# Patient Record
Sex: FEMALE | Race: WHITE | NOT HISPANIC OR LATINO | Employment: UNEMPLOYED | URBAN - METROPOLITAN AREA
[De-identification: names, ages, dates, MRNs, and addresses within clinical notes are randomized per-mention and may not be internally consistent; named-entity substitution may affect disease eponyms.]

---

## 2021-06-01 ENCOUNTER — RECORDS - HEALTHEAST (OUTPATIENT)
Dept: ADMINISTRATIVE | Facility: CLINIC | Age: 41
End: 2021-06-01

## 2021-06-03 ENCOUNTER — RECORDS - HEALTHEAST (OUTPATIENT)
Dept: ADMINISTRATIVE | Facility: CLINIC | Age: 41
End: 2021-06-03

## 2022-05-06 ENCOUNTER — HOSPITAL ENCOUNTER (EMERGENCY)
Facility: HOSPITAL | Age: 42
End: 2022-05-06

## 2023-11-17 ENCOUNTER — APPOINTMENT (OUTPATIENT)
Dept: CT IMAGING | Facility: CLINIC | Age: 43
End: 2023-11-17
Attending: STUDENT IN AN ORGANIZED HEALTH CARE EDUCATION/TRAINING PROGRAM
Payer: MEDICAID

## 2023-11-17 ENCOUNTER — HOSPITAL ENCOUNTER (INPATIENT)
Facility: CLINIC | Age: 43
LOS: 3 days | Discharge: HOME | End: 2023-11-20
Attending: EMERGENCY MEDICINE | Admitting: INTERNAL MEDICINE
Payer: MEDICAID

## 2023-11-17 DIAGNOSIS — N20.0 CALCULUS OF KIDNEY: ICD-10-CM

## 2023-11-17 DIAGNOSIS — Z76.89 ENCOUNTER TO ESTABLISH CARE: ICD-10-CM

## 2023-11-17 DIAGNOSIS — N20.1 LEFT URETERAL STONE: ICD-10-CM

## 2023-11-17 DIAGNOSIS — N12 PYELONEPHRITIS OF LEFT KIDNEY: Primary | ICD-10-CM

## 2023-11-17 DIAGNOSIS — R10.9 LEFT FLANK PAIN: ICD-10-CM

## 2023-11-17 DIAGNOSIS — N39.0 LOWER URINARY TRACT INFECTIOUS DISEASE: ICD-10-CM

## 2023-11-17 LAB
ALBUMIN SERPL BCG-MCNC: 3.8 G/DL (ref 3.5–5.2)
ALBUMIN UR-MCNC: 20 MG/DL
ALP SERPL-CCNC: 59 U/L (ref 40–150)
ALT SERPL W P-5'-P-CCNC: <5 U/L (ref 0–50)
ANION GAP SERPL CALCULATED.3IONS-SCNC: 9 MMOL/L (ref 7–15)
APPEARANCE UR: ABNORMAL
AST SERPL W P-5'-P-CCNC: 13 U/L (ref 0–45)
BACTERIA #/AREA URNS HPF: ABNORMAL /HPF
BASOPHILS # BLD AUTO: 0.1 10E3/UL (ref 0–0.2)
BASOPHILS NFR BLD AUTO: 0 %
BILIRUB SERPL-MCNC: 0.3 MG/DL
BILIRUB UR QL STRIP: NEGATIVE
BUN SERPL-MCNC: 15.1 MG/DL (ref 6–20)
CALCIUM SERPL-MCNC: 9.2 MG/DL (ref 8.6–10)
CHLORIDE SERPL-SCNC: 101 MMOL/L (ref 98–107)
COLOR UR AUTO: ABNORMAL
CREAT SERPL-MCNC: 0.87 MG/DL (ref 0.51–0.95)
CRP SERPL-MCNC: 78.8 MG/L
DEPRECATED HCO3 PLAS-SCNC: 26 MMOL/L (ref 22–29)
EGFRCR SERPLBLD CKD-EPI 2021: 85 ML/MIN/1.73M2
EOSINOPHIL # BLD AUTO: 0 10E3/UL (ref 0–0.7)
EOSINOPHIL NFR BLD AUTO: 0 %
ERYTHROCYTE [DISTWIDTH] IN BLOOD BY AUTOMATED COUNT: 12.2 % (ref 10–15)
GLUCOSE SERPL-MCNC: 90 MG/DL (ref 70–99)
GLUCOSE UR STRIP-MCNC: NEGATIVE MG/DL
HCG UR QL: NEGATIVE
HCT VFR BLD AUTO: 34.8 % (ref 35–47)
HGB BLD-MCNC: 11.4 G/DL (ref 11.7–15.7)
HGB UR QL STRIP: ABNORMAL
IMM GRANULOCYTES # BLD: 0.1 10E3/UL
IMM GRANULOCYTES NFR BLD: 1 %
KETONES UR STRIP-MCNC: NEGATIVE MG/DL
LACTATE SERPL-SCNC: 0.5 MMOL/L (ref 0.7–2)
LEUKOCYTE ESTERASE UR QL STRIP: ABNORMAL
LYMPHOCYTES # BLD AUTO: 0.6 10E3/UL (ref 0.8–5.3)
LYMPHOCYTES NFR BLD AUTO: 5 %
MCH RBC QN AUTO: 28.3 PG (ref 26.5–33)
MCHC RBC AUTO-ENTMCNC: 32.8 G/DL (ref 31.5–36.5)
MCV RBC AUTO: 86 FL (ref 78–100)
MONOCYTES # BLD AUTO: 0.9 10E3/UL (ref 0–1.3)
MONOCYTES NFR BLD AUTO: 7 %
NEUTROPHILS # BLD AUTO: 11.9 10E3/UL (ref 1.6–8.3)
NEUTROPHILS NFR BLD AUTO: 87 %
NITRATE UR QL: POSITIVE
NRBC # BLD AUTO: 0 10E3/UL
NRBC BLD AUTO-RTO: 0 /100
PH UR STRIP: 5.5 [PH] (ref 5–7)
PLATELET # BLD AUTO: 180 10E3/UL (ref 150–450)
POTASSIUM SERPL-SCNC: 4.2 MMOL/L (ref 3.4–5.3)
PROT SERPL-MCNC: 6.5 G/DL (ref 6.4–8.3)
RBC # BLD AUTO: 4.03 10E6/UL (ref 3.8–5.2)
RBC URINE: 22 /HPF
SODIUM SERPL-SCNC: 136 MMOL/L (ref 135–145)
SP GR UR STRIP: 1.01 (ref 1–1.03)
SQUAMOUS EPITHELIAL: <1 /HPF
UROBILINOGEN UR STRIP-MCNC: <2 MG/DL
WBC # BLD AUTO: 13.7 10E3/UL (ref 4–11)
WBC CLUMPS #/AREA URNS HPF: PRESENT /HPF
WBC URINE: 127 /HPF

## 2023-11-17 PROCEDURE — 120N000001 HC R&B MED SURG/OB

## 2023-11-17 PROCEDURE — 250N000011 HC RX IP 250 OP 636: Performed by: EMERGENCY MEDICINE

## 2023-11-17 PROCEDURE — 81001 URINALYSIS AUTO W/SCOPE: CPT | Performed by: STUDENT IN AN ORGANIZED HEALTH CARE EDUCATION/TRAINING PROGRAM

## 2023-11-17 PROCEDURE — 83735 ASSAY OF MAGNESIUM: CPT | Performed by: INTERNAL MEDICINE

## 2023-11-17 PROCEDURE — 80053 COMPREHEN METABOLIC PANEL: CPT | Performed by: STUDENT IN AN ORGANIZED HEALTH CARE EDUCATION/TRAINING PROGRAM

## 2023-11-17 PROCEDURE — 86140 C-REACTIVE PROTEIN: CPT

## 2023-11-17 PROCEDURE — 74177 CT ABD & PELVIS W/CONTRAST: CPT

## 2023-11-17 PROCEDURE — 96376 TX/PRO/DX INJ SAME DRUG ADON: CPT

## 2023-11-17 PROCEDURE — 87186 SC STD MICRODIL/AGAR DIL: CPT | Performed by: STUDENT IN AN ORGANIZED HEALTH CARE EDUCATION/TRAINING PROGRAM

## 2023-11-17 PROCEDURE — 99223 1ST HOSP IP/OBS HIGH 75: CPT | Performed by: INTERNAL MEDICINE

## 2023-11-17 PROCEDURE — 87040 BLOOD CULTURE FOR BACTERIA: CPT

## 2023-11-17 PROCEDURE — 36415 COLL VENOUS BLD VENIPUNCTURE: CPT

## 2023-11-17 PROCEDURE — 250N000011 HC RX IP 250 OP 636: Performed by: STUDENT IN AN ORGANIZED HEALTH CARE EDUCATION/TRAINING PROGRAM

## 2023-11-17 PROCEDURE — 258N000003 HC RX IP 258 OP 636

## 2023-11-17 PROCEDURE — 99285 EMERGENCY DEPT VISIT HI MDM: CPT | Mod: 25

## 2023-11-17 PROCEDURE — 96365 THER/PROPH/DIAG IV INF INIT: CPT | Mod: 59

## 2023-11-17 PROCEDURE — 96361 HYDRATE IV INFUSION ADD-ON: CPT

## 2023-11-17 PROCEDURE — 250N000011 HC RX IP 250 OP 636

## 2023-11-17 PROCEDURE — 81025 URINE PREGNANCY TEST: CPT

## 2023-11-17 PROCEDURE — 87086 URINE CULTURE/COLONY COUNT: CPT | Performed by: STUDENT IN AN ORGANIZED HEALTH CARE EDUCATION/TRAINING PROGRAM

## 2023-11-17 PROCEDURE — 36415 COLL VENOUS BLD VENIPUNCTURE: CPT | Performed by: STUDENT IN AN ORGANIZED HEALTH CARE EDUCATION/TRAINING PROGRAM

## 2023-11-17 PROCEDURE — 83605 ASSAY OF LACTIC ACID: CPT

## 2023-11-17 PROCEDURE — 85025 COMPLETE CBC W/AUTO DIFF WBC: CPT | Performed by: STUDENT IN AN ORGANIZED HEALTH CARE EDUCATION/TRAINING PROGRAM

## 2023-11-17 PROCEDURE — 96375 TX/PRO/DX INJ NEW DRUG ADDON: CPT

## 2023-11-17 RX ORDER — VARENICLINE TARTRATE 0.5 (11)-1
1 KIT ORAL 2 TIMES DAILY
COMMUNITY

## 2023-11-17 RX ORDER — HYDROMORPHONE HYDROCHLORIDE 1 MG/ML
0.5 INJECTION, SOLUTION INTRAMUSCULAR; INTRAVENOUS; SUBCUTANEOUS ONCE
Status: COMPLETED | OUTPATIENT
Start: 2023-11-17 | End: 2023-11-17

## 2023-11-17 RX ORDER — CEFTRIAXONE 1 G/1
1 INJECTION, POWDER, FOR SOLUTION INTRAMUSCULAR; INTRAVENOUS EVERY 24 HOURS
Status: DISCONTINUED | OUTPATIENT
Start: 2023-11-18 | End: 2023-11-18

## 2023-11-17 RX ORDER — ACETAMINOPHEN 500 MG
500-1000 TABLET ORAL EVERY 6 HOURS PRN
COMMUNITY

## 2023-11-17 RX ORDER — HYDROMORPHONE HYDROCHLORIDE 1 MG/ML
0.5 INJECTION, SOLUTION INTRAMUSCULAR; INTRAVENOUS; SUBCUTANEOUS EVERY 30 MIN PRN
Status: DISCONTINUED | OUTPATIENT
Start: 2023-11-17 | End: 2023-11-18

## 2023-11-17 RX ORDER — ALBUTEROL SULFATE 90 UG/1
2 AEROSOL, METERED RESPIRATORY (INHALATION) EVERY 6 HOURS PRN
COMMUNITY

## 2023-11-17 RX ORDER — CEFTRIAXONE 1 G/1
1 INJECTION, POWDER, FOR SOLUTION INTRAMUSCULAR; INTRAVENOUS ONCE
Status: COMPLETED | OUTPATIENT
Start: 2023-11-17 | End: 2023-11-17

## 2023-11-17 RX ORDER — KETOROLAC TROMETHAMINE 15 MG/ML
15 INJECTION, SOLUTION INTRAMUSCULAR; INTRAVENOUS ONCE
Status: DISCONTINUED | OUTPATIENT
Start: 2023-11-17 | End: 2023-11-17

## 2023-11-17 RX ORDER — IOPAMIDOL 755 MG/ML
100 INJECTION, SOLUTION INTRAVASCULAR ONCE
Status: COMPLETED | OUTPATIENT
Start: 2023-11-17 | End: 2023-11-17

## 2023-11-17 RX ADMIN — HYDROMORPHONE HYDROCHLORIDE 0.5 MG: 1 INJECTION, SOLUTION INTRAMUSCULAR; INTRAVENOUS; SUBCUTANEOUS at 22:59

## 2023-11-17 RX ADMIN — HYDROMORPHONE HYDROCHLORIDE 0.5 MG: 1 INJECTION, SOLUTION INTRAMUSCULAR; INTRAVENOUS; SUBCUTANEOUS at 21:33

## 2023-11-17 RX ADMIN — CEFTRIAXONE 1 G: 1 INJECTION, POWDER, FOR SOLUTION INTRAMUSCULAR; INTRAVENOUS at 21:52

## 2023-11-17 RX ADMIN — IOPAMIDOL 100 ML: 755 INJECTION, SOLUTION INTRAVENOUS at 21:19

## 2023-11-17 RX ADMIN — SODIUM CHLORIDE 1000 ML: 9 INJECTION, SOLUTION INTRAVENOUS at 21:12

## 2023-11-17 ASSESSMENT — ENCOUNTER SYMPTOMS
FEVER: 1
NAUSEA: 0
HEMATURIA: 0
DIARRHEA: 1
VOMITING: 1
FATIGUE: 1
FREQUENCY: 1
ABDOMINAL PAIN: 0
CHILLS: 1
FLANK PAIN: 1
BACK PAIN: 1
DYSURIA: 1

## 2023-11-17 ASSESSMENT — ACTIVITIES OF DAILY LIVING (ADL)
ADLS_ACUITY_SCORE: 35
ADLS_ACUITY_SCORE: 35

## 2023-11-18 ENCOUNTER — ANESTHESIA EVENT (OUTPATIENT)
Dept: SURGERY | Facility: CLINIC | Age: 43
End: 2023-11-18
Payer: MEDICAID

## 2023-11-18 ENCOUNTER — APPOINTMENT (OUTPATIENT)
Dept: ULTRASOUND IMAGING | Facility: CLINIC | Age: 43
End: 2023-11-18
Attending: INTERNAL MEDICINE
Payer: MEDICAID

## 2023-11-18 ENCOUNTER — ANESTHESIA (OUTPATIENT)
Dept: SURGERY | Facility: CLINIC | Age: 43
End: 2023-11-18
Payer: MEDICAID

## 2023-11-18 ENCOUNTER — APPOINTMENT (OUTPATIENT)
Dept: RADIOLOGY | Facility: CLINIC | Age: 43
End: 2023-11-18
Attending: INTERNAL MEDICINE
Payer: MEDICAID

## 2023-11-18 LAB
ALBUMIN SERPL BCG-MCNC: 3.1 G/DL (ref 3.5–5.2)
ALP SERPL-CCNC: 62 U/L (ref 40–150)
ALT SERPL W P-5'-P-CCNC: 15 U/L (ref 0–50)
ANION GAP SERPL CALCULATED.3IONS-SCNC: 8 MMOL/L (ref 7–15)
AST SERPL W P-5'-P-CCNC: 23 U/L (ref 0–45)
BASOPHILS # BLD AUTO: 0 10E3/UL (ref 0–0.2)
BASOPHILS NFR BLD AUTO: 0 %
BILIRUB SERPL-MCNC: 0.3 MG/DL
BUN SERPL-MCNC: 13.1 MG/DL (ref 6–20)
CALCIUM SERPL-MCNC: 8 MG/DL (ref 8.6–10)
CHLORIDE SERPL-SCNC: 107 MMOL/L (ref 98–107)
CREAT SERPL-MCNC: 0.8 MG/DL (ref 0.51–0.95)
DEPRECATED HCO3 PLAS-SCNC: 23 MMOL/L (ref 22–29)
EGFRCR SERPLBLD CKD-EPI 2021: >90 ML/MIN/1.73M2
EOSINOPHIL # BLD AUTO: 0 10E3/UL (ref 0–0.7)
EOSINOPHIL NFR BLD AUTO: 0 %
ERYTHROCYTE [DISTWIDTH] IN BLOOD BY AUTOMATED COUNT: 12.3 % (ref 10–15)
GLUCOSE SERPL-MCNC: 98 MG/DL (ref 70–99)
HCT VFR BLD AUTO: 31.7 % (ref 35–47)
HGB BLD-MCNC: 10.4 G/DL (ref 11.7–15.7)
IMM GRANULOCYTES # BLD: 0.2 10E3/UL
IMM GRANULOCYTES NFR BLD: 1 %
LACTATE SERPL-SCNC: 0.8 MMOL/L (ref 0.7–2)
LYMPHOCYTES # BLD AUTO: 0.5 10E3/UL (ref 0.8–5.3)
LYMPHOCYTES NFR BLD AUTO: 3 %
MAGNESIUM SERPL-MCNC: 1.8 MG/DL (ref 1.7–2.3)
MCH RBC QN AUTO: 28.2 PG (ref 26.5–33)
MCHC RBC AUTO-ENTMCNC: 32.8 G/DL (ref 31.5–36.5)
MCV RBC AUTO: 86 FL (ref 78–100)
MONOCYTES # BLD AUTO: 1 10E3/UL (ref 0–1.3)
MONOCYTES NFR BLD AUTO: 5 %
NEUTROPHILS # BLD AUTO: 17.5 10E3/UL (ref 1.6–8.3)
NEUTROPHILS NFR BLD AUTO: 91 %
NRBC # BLD AUTO: 0 10E3/UL
NRBC BLD AUTO-RTO: 0 /100
PLATELET # BLD AUTO: 156 10E3/UL (ref 150–450)
POTASSIUM SERPL-SCNC: 3.4 MMOL/L (ref 3.4–5.3)
POTASSIUM SERPL-SCNC: 4.5 MMOL/L (ref 3.4–5.3)
PROCALCITONIN SERPL IA-MCNC: 2.09 NG/ML
PROT SERPL-MCNC: 5.3 G/DL (ref 6.4–8.3)
RBC # BLD AUTO: 3.69 10E6/UL (ref 3.8–5.2)
SODIUM SERPL-SCNC: 138 MMOL/L (ref 135–145)
WBC # BLD AUTO: 19.2 10E3/UL (ref 4–11)

## 2023-11-18 PROCEDURE — 82040 ASSAY OF SERUM ALBUMIN: CPT | Performed by: INTERNAL MEDICINE

## 2023-11-18 PROCEDURE — 83605 ASSAY OF LACTIC ACID: CPT | Performed by: INTERNAL MEDICINE

## 2023-11-18 PROCEDURE — C1769 GUIDE WIRE: HCPCS | Performed by: UROLOGY

## 2023-11-18 PROCEDURE — 85025 COMPLETE CBC W/AUTO DIFF WBC: CPT | Performed by: INTERNAL MEDICINE

## 2023-11-18 PROCEDURE — 250N000011 HC RX IP 250 OP 636: Performed by: ANESTHESIOLOGY

## 2023-11-18 PROCEDURE — C2617 STENT, NON-COR, TEM W/O DEL: HCPCS | Performed by: UROLOGY

## 2023-11-18 PROCEDURE — 250N000011 HC RX IP 250 OP 636: Performed by: NURSE ANESTHETIST, CERTIFIED REGISTERED

## 2023-11-18 PROCEDURE — 258N000003 HC RX IP 258 OP 636: Performed by: ANESTHESIOLOGY

## 2023-11-18 PROCEDURE — 250N000011 HC RX IP 250 OP 636: Performed by: INTERNAL MEDICINE

## 2023-11-18 PROCEDURE — 84132 ASSAY OF SERUM POTASSIUM: CPT | Performed by: INTERNAL MEDICINE

## 2023-11-18 PROCEDURE — 52332 CYSTOSCOPY AND TREATMENT: CPT | Mod: LT | Performed by: UROLOGY

## 2023-11-18 PROCEDURE — 255N000002 HC RX 255 OP 636: Performed by: UROLOGY

## 2023-11-18 PROCEDURE — 87102 FUNGUS ISOLATION CULTURE: CPT | Performed by: UROLOGY

## 2023-11-18 PROCEDURE — 370N000017 HC ANESTHESIA TECHNICAL FEE, PER MIN: Performed by: UROLOGY

## 2023-11-18 PROCEDURE — 0T778DZ DILATION OF LEFT URETER WITH INTRALUMINAL DEVICE, VIA NATURAL OR ARTIFICIAL OPENING ENDOSCOPIC: ICD-10-PCS | Performed by: UROLOGY

## 2023-11-18 PROCEDURE — 360N000082 HC SURGERY LEVEL 2 W/ FLUORO, PER MIN: Performed by: UROLOGY

## 2023-11-18 PROCEDURE — 96375 TX/PRO/DX INJ NEW DRUG ADDON: CPT

## 2023-11-18 PROCEDURE — 36415 COLL VENOUS BLD VENIPUNCTURE: CPT | Performed by: INTERNAL MEDICINE

## 2023-11-18 PROCEDURE — 710N000010 HC RECOVERY PHASE 1, LEVEL 2, PER MIN: Performed by: UROLOGY

## 2023-11-18 PROCEDURE — 250N000013 HC RX MED GY IP 250 OP 250 PS 637: Performed by: UROLOGY

## 2023-11-18 PROCEDURE — BT1F1ZZ FLUOROSCOPY OF LEFT KIDNEY, URETER AND BLADDER USING LOW OSMOLAR CONTRAST: ICD-10-PCS | Performed by: UROLOGY

## 2023-11-18 PROCEDURE — 99233 SBSQ HOSP IP/OBS HIGH 50: CPT | Performed by: INTERNAL MEDICINE

## 2023-11-18 PROCEDURE — 999N000182 XR SURGERY CARM FLUORO GREATER THAN 5 MIN: Mod: TC

## 2023-11-18 PROCEDURE — 258N000003 HC RX IP 258 OP 636: Performed by: INTERNAL MEDICINE

## 2023-11-18 PROCEDURE — 84145 PROCALCITONIN (PCT): CPT | Performed by: INTERNAL MEDICINE

## 2023-11-18 PROCEDURE — 272N000001 HC OR GENERAL SUPPLY STERILE: Performed by: UROLOGY

## 2023-11-18 PROCEDURE — 250N000009 HC RX 250: Performed by: UROLOGY

## 2023-11-18 PROCEDURE — 74420 UROGRAPHY RTRGR +-KUB: CPT | Mod: 26 | Performed by: UROLOGY

## 2023-11-18 PROCEDURE — 250N000013 HC RX MED GY IP 250 OP 250 PS 637: Performed by: INTERNAL MEDICINE

## 2023-11-18 PROCEDURE — 258N000003 HC RX IP 258 OP 636: Performed by: NURSE ANESTHETIST, CERTIFIED REGISTERED

## 2023-11-18 PROCEDURE — 76775 US EXAM ABDO BACK WALL LIM: CPT

## 2023-11-18 PROCEDURE — 120N000001 HC R&B MED SURG/OB

## 2023-11-18 PROCEDURE — 99254 IP/OBS CNSLTJ NEW/EST MOD 60: CPT | Mod: 25 | Performed by: UROLOGY

## 2023-11-18 PROCEDURE — 87086 URINE CULTURE/COLONY COUNT: CPT | Performed by: UROLOGY

## 2023-11-18 PROCEDURE — 250N000009 HC RX 250: Performed by: NURSE ANESTHETIST, CERTIFIED REGISTERED

## 2023-11-18 DEVICE — URETERAL STENT
Type: IMPLANTABLE DEVICE | Site: URETER | Status: NON-FUNCTIONAL
Brand: PERCUFLEX™ PLUS
Removed: 2023-12-07

## 2023-11-18 RX ORDER — LIDOCAINE 40 MG/G
CREAM TOPICAL
Status: DISCONTINUED | OUTPATIENT
Start: 2023-11-18 | End: 2023-11-20 | Stop reason: HOSPADM

## 2023-11-18 RX ORDER — LIDOCAINE HYDROCHLORIDE 10 MG/ML
INJECTION, SOLUTION INFILTRATION; PERINEURAL PRN
Status: DISCONTINUED | OUTPATIENT
Start: 2023-11-18 | End: 2023-11-18

## 2023-11-18 RX ORDER — ALBUTEROL SULFATE 90 UG/1
2 AEROSOL, METERED RESPIRATORY (INHALATION) EVERY 6 HOURS PRN
Status: DISCONTINUED | OUTPATIENT
Start: 2023-11-18 | End: 2023-11-20 | Stop reason: HOSPADM

## 2023-11-18 RX ORDER — METHOCARBAMOL 500 MG/1
500 TABLET, FILM COATED ORAL 3 TIMES DAILY PRN
Status: DISCONTINUED | OUTPATIENT
Start: 2023-11-18 | End: 2023-11-20 | Stop reason: HOSPADM

## 2023-11-18 RX ORDER — OXYCODONE HYDROCHLORIDE 5 MG/1
5 TABLET ORAL EVERY 4 HOURS PRN
Status: DISCONTINUED | OUTPATIENT
Start: 2023-11-18 | End: 2023-11-19

## 2023-11-18 RX ORDER — OXYCODONE HYDROCHLORIDE 5 MG/1
10 TABLET ORAL
Status: DISCONTINUED | OUTPATIENT
Start: 2023-11-18 | End: 2023-11-18 | Stop reason: HOSPADM

## 2023-11-18 RX ORDER — ACETAMINOPHEN 650 MG/1
650 SUPPOSITORY RECTAL EVERY 4 HOURS PRN
Status: DISCONTINUED | OUTPATIENT
Start: 2023-11-18 | End: 2023-11-20 | Stop reason: HOSPADM

## 2023-11-18 RX ORDER — NALOXONE HYDROCHLORIDE 0.4 MG/ML
0.2 INJECTION, SOLUTION INTRAMUSCULAR; INTRAVENOUS; SUBCUTANEOUS
Status: DISCONTINUED | OUTPATIENT
Start: 2023-11-18 | End: 2023-11-20 | Stop reason: HOSPADM

## 2023-11-18 RX ORDER — AMOXICILLIN 250 MG
2 CAPSULE ORAL 2 TIMES DAILY PRN
Status: DISCONTINUED | OUTPATIENT
Start: 2023-11-18 | End: 2023-11-20 | Stop reason: HOSPADM

## 2023-11-18 RX ORDER — NALOXONE HYDROCHLORIDE 0.4 MG/ML
0.4 INJECTION, SOLUTION INTRAMUSCULAR; INTRAVENOUS; SUBCUTANEOUS
Status: DISCONTINUED | OUTPATIENT
Start: 2023-11-18 | End: 2023-11-20 | Stop reason: HOSPADM

## 2023-11-18 RX ORDER — MAGNESIUM OXIDE 400 MG/1
400 TABLET ORAL EVERY 4 HOURS
Qty: 2 TABLET | Refills: 0 | Status: COMPLETED | OUTPATIENT
Start: 2023-11-18 | End: 2023-11-18

## 2023-11-18 RX ORDER — LIDOCAINE 40 MG/G
CREAM TOPICAL
Status: DISCONTINUED | OUTPATIENT
Start: 2023-11-18 | End: 2023-11-18 | Stop reason: HOSPADM

## 2023-11-18 RX ORDER — ONDANSETRON 2 MG/ML
INJECTION INTRAMUSCULAR; INTRAVENOUS PRN
Status: DISCONTINUED | OUTPATIENT
Start: 2023-11-18 | End: 2023-11-18

## 2023-11-18 RX ORDER — ONDANSETRON 4 MG/1
4 TABLET, ORALLY DISINTEGRATING ORAL EVERY 30 MIN PRN
Status: DISCONTINUED | OUTPATIENT
Start: 2023-11-18 | End: 2023-11-18 | Stop reason: HOSPADM

## 2023-11-18 RX ORDER — KETOROLAC TROMETHAMINE 30 MG/ML
INJECTION, SOLUTION INTRAMUSCULAR; INTRAVENOUS PRN
Status: DISCONTINUED | OUTPATIENT
Start: 2023-11-18 | End: 2023-11-18

## 2023-11-18 RX ORDER — KETOROLAC TROMETHAMINE 15 MG/ML
15 INJECTION, SOLUTION INTRAMUSCULAR; INTRAVENOUS EVERY 6 HOURS PRN
Status: DISCONTINUED | OUTPATIENT
Start: 2023-11-18 | End: 2023-11-20 | Stop reason: HOSPADM

## 2023-11-18 RX ORDER — PROPOFOL 10 MG/ML
INJECTION, EMULSION INTRAVENOUS CONTINUOUS PRN
Status: DISCONTINUED | OUTPATIENT
Start: 2023-11-18 | End: 2023-11-18

## 2023-11-18 RX ORDER — LIDOCAINE 40 MG/G
CREAM TOPICAL
Status: DISCONTINUED | OUTPATIENT
Start: 2023-11-18 | End: 2023-11-19

## 2023-11-18 RX ORDER — SODIUM CHLORIDE, SODIUM LACTATE, POTASSIUM CHLORIDE, CALCIUM CHLORIDE 600; 310; 30; 20 MG/100ML; MG/100ML; MG/100ML; MG/100ML
INJECTION, SOLUTION INTRAVENOUS CONTINUOUS PRN
Status: DISCONTINUED | OUTPATIENT
Start: 2023-11-18 | End: 2023-11-18

## 2023-11-18 RX ORDER — ACETAMINOPHEN 325 MG/1
650 TABLET ORAL EVERY 4 HOURS PRN
Status: DISCONTINUED | OUTPATIENT
Start: 2023-11-18 | End: 2023-11-20 | Stop reason: HOSPADM

## 2023-11-18 RX ORDER — CEFTRIAXONE 1 G/1
1 INJECTION, POWDER, FOR SOLUTION INTRAMUSCULAR; INTRAVENOUS EVERY 24 HOURS
Status: DISCONTINUED | OUTPATIENT
Start: 2023-11-18 | End: 2023-11-18

## 2023-11-18 RX ORDER — CEFTRIAXONE 1 G/1
1 INJECTION, POWDER, FOR SOLUTION INTRAMUSCULAR; INTRAVENOUS ONCE
Status: COMPLETED | OUTPATIENT
Start: 2023-11-18 | End: 2023-11-18

## 2023-11-18 RX ORDER — DEXAMETHASONE SODIUM PHOSPHATE 4 MG/ML
INJECTION, SOLUTION INTRA-ARTICULAR; INTRALESIONAL; INTRAMUSCULAR; INTRAVENOUS; SOFT TISSUE PRN
Status: DISCONTINUED | OUTPATIENT
Start: 2023-11-18 | End: 2023-11-18

## 2023-11-18 RX ORDER — VANCOMYCIN HYDROCHLORIDE 1 G/200ML
1000 INJECTION, SOLUTION INTRAVENOUS ONCE
Qty: 200 ML | Refills: 0 | Status: COMPLETED | OUTPATIENT
Start: 2023-11-18 | End: 2023-11-18

## 2023-11-18 RX ORDER — OXYCODONE HYDROCHLORIDE 5 MG/1
5 TABLET ORAL
Status: DISCONTINUED | OUTPATIENT
Start: 2023-11-18 | End: 2023-11-18 | Stop reason: HOSPADM

## 2023-11-18 RX ORDER — HYDROXYZINE HYDROCHLORIDE 25 MG/1
25 TABLET, FILM COATED ORAL 3 TIMES DAILY PRN
Status: COMPLETED | OUTPATIENT
Start: 2023-11-18 | End: 2023-11-19

## 2023-11-18 RX ORDER — PROPOFOL 10 MG/ML
INJECTION, EMULSION INTRAVENOUS PRN
Status: DISCONTINUED | OUTPATIENT
Start: 2023-11-18 | End: 2023-11-18

## 2023-11-18 RX ORDER — POTASSIUM CHLORIDE 1500 MG/1
20 TABLET, EXTENDED RELEASE ORAL ONCE
Qty: 1 TABLET | Refills: 0 | Status: COMPLETED | OUTPATIENT
Start: 2023-11-18 | End: 2023-11-18

## 2023-11-18 RX ORDER — HYDROMORPHONE HYDROCHLORIDE 1 MG/ML
0.2 INJECTION, SOLUTION INTRAMUSCULAR; INTRAVENOUS; SUBCUTANEOUS EVERY 4 HOURS PRN
Status: DISCONTINUED | OUTPATIENT
Start: 2023-11-18 | End: 2023-11-19

## 2023-11-18 RX ORDER — HALOPERIDOL 5 MG/ML
1 INJECTION INTRAMUSCULAR
Status: DISCONTINUED | OUTPATIENT
Start: 2023-11-18 | End: 2023-11-18 | Stop reason: HOSPADM

## 2023-11-18 RX ORDER — KETOROLAC TROMETHAMINE 30 MG/ML
30 INJECTION, SOLUTION INTRAMUSCULAR; INTRAVENOUS ONCE
Status: COMPLETED | OUTPATIENT
Start: 2023-11-18 | End: 2023-11-18

## 2023-11-18 RX ORDER — HYDROCODONE BITARTRATE AND ACETAMINOPHEN 5; 325 MG/1; MG/1
1 TABLET ORAL EVERY 4 HOURS PRN
Status: DISCONTINUED | OUTPATIENT
Start: 2023-11-18 | End: 2023-11-19

## 2023-11-18 RX ORDER — FENTANYL CITRATE 50 UG/ML
25 INJECTION, SOLUTION INTRAMUSCULAR; INTRAVENOUS
Status: DISCONTINUED | OUTPATIENT
Start: 2023-11-18 | End: 2023-11-18 | Stop reason: HOSPADM

## 2023-11-18 RX ORDER — SODIUM CHLORIDE 9 MG/ML
INJECTION, SOLUTION INTRAVENOUS CONTINUOUS
Status: DISCONTINUED | OUTPATIENT
Start: 2023-11-18 | End: 2023-11-19

## 2023-11-18 RX ORDER — ONDANSETRON 2 MG/ML
4 INJECTION INTRAMUSCULAR; INTRAVENOUS EVERY 30 MIN PRN
Status: DISCONTINUED | OUTPATIENT
Start: 2023-11-18 | End: 2023-11-18 | Stop reason: HOSPADM

## 2023-11-18 RX ORDER — SODIUM CHLORIDE, SODIUM LACTATE, POTASSIUM CHLORIDE, CALCIUM CHLORIDE 600; 310; 30; 20 MG/100ML; MG/100ML; MG/100ML; MG/100ML
INJECTION, SOLUTION INTRAVENOUS CONTINUOUS
Status: DISCONTINUED | OUTPATIENT
Start: 2023-11-18 | End: 2023-11-18 | Stop reason: HOSPADM

## 2023-11-18 RX ORDER — AMOXICILLIN 250 MG
1 CAPSULE ORAL 2 TIMES DAILY PRN
Status: DISCONTINUED | OUTPATIENT
Start: 2023-11-18 | End: 2023-11-20 | Stop reason: HOSPADM

## 2023-11-18 RX ORDER — FENTANYL CITRATE 50 UG/ML
INJECTION, SOLUTION INTRAMUSCULAR; INTRAVENOUS PRN
Status: DISCONTINUED | OUTPATIENT
Start: 2023-11-18 | End: 2023-11-18

## 2023-11-18 RX ORDER — SODIUM CHLORIDE 9 MG/ML
INJECTION, SOLUTION INTRAVENOUS CONTINUOUS
Status: DISCONTINUED | OUTPATIENT
Start: 2023-11-18 | End: 2023-11-18

## 2023-11-18 RX ORDER — CEFTRIAXONE 1 G/1
1 INJECTION, POWDER, FOR SOLUTION INTRAMUSCULAR; INTRAVENOUS EVERY 24 HOURS
Status: DISCONTINUED | OUTPATIENT
Start: 2023-11-18 | End: 2023-11-20 | Stop reason: HOSPADM

## 2023-11-18 RX ORDER — CALCIUM CARBONATE 500 MG/1
1000 TABLET, CHEWABLE ORAL 4 TIMES DAILY PRN
Status: DISCONTINUED | OUTPATIENT
Start: 2023-11-18 | End: 2023-11-20 | Stop reason: HOSPADM

## 2023-11-18 RX ADMIN — POTASSIUM CHLORIDE 20 MEQ: 1500 TABLET, EXTENDED RELEASE ORAL at 15:12

## 2023-11-18 RX ADMIN — LIDOCAINE HYDROCHLORIDE 40 MG: 10 INJECTION, SOLUTION INFILTRATION; PERINEURAL at 07:30

## 2023-11-18 RX ADMIN — SODIUM CHLORIDE: 9 INJECTION, SOLUTION INTRAVENOUS at 00:31

## 2023-11-18 RX ADMIN — PROPOFOL 30 MG: 10 INJECTION, EMULSION INTRAVENOUS at 07:35

## 2023-11-18 RX ADMIN — CEFTRIAXONE 1 G: 1 INJECTION, POWDER, FOR SOLUTION INTRAMUSCULAR; INTRAVENOUS at 21:13

## 2023-11-18 RX ADMIN — FENTANYL CITRATE 25 MCG: 50 INJECTION, SOLUTION INTRAMUSCULAR; INTRAVENOUS at 09:22

## 2023-11-18 RX ADMIN — KETOROLAC TROMETHAMINE 15 MG: 15 INJECTION, SOLUTION INTRAMUSCULAR; INTRAVENOUS at 23:58

## 2023-11-18 RX ADMIN — SODIUM CHLORIDE, PRESERVATIVE FREE: 5 INJECTION INTRAVENOUS at 11:30

## 2023-11-18 RX ADMIN — OXYCODONE HYDROCHLORIDE 5 MG: 5 TABLET ORAL at 23:56

## 2023-11-18 RX ADMIN — KETOROLAC TROMETHAMINE 15 MG: 30 INJECTION, SOLUTION INTRAMUSCULAR at 08:07

## 2023-11-18 RX ADMIN — SODIUM CHLORIDE, PRESERVATIVE FREE: 5 INJECTION INTRAVENOUS at 17:49

## 2023-11-18 RX ADMIN — Medication 400 MG: at 18:51

## 2023-11-18 RX ADMIN — SODIUM CHLORIDE 1428 ML: 9 INJECTION, SOLUTION INTRAVENOUS at 12:16

## 2023-11-18 RX ADMIN — HYDROMORPHONE HYDROCHLORIDE 0.2 MG: 1 INJECTION, SOLUTION INTRAMUSCULAR; INTRAVENOUS; SUBCUTANEOUS at 21:11

## 2023-11-18 RX ADMIN — VANCOMYCIN HYDROCHLORIDE 1000 MG: 1 INJECTION, SOLUTION INTRAVENOUS at 12:05

## 2023-11-18 RX ADMIN — HYDROMORPHONE HYDROCHLORIDE 0.2 MG: 1 INJECTION, SOLUTION INTRAMUSCULAR; INTRAVENOUS; SUBCUTANEOUS at 07:01

## 2023-11-18 RX ADMIN — ONDANSETRON 4 MG: 2 INJECTION INTRAMUSCULAR; INTRAVENOUS at 07:30

## 2023-11-18 RX ADMIN — DEXMEDETOMIDINE HYDROCHLORIDE 8 MCG: 100 INJECTION, SOLUTION INTRAVENOUS at 08:04

## 2023-11-18 RX ADMIN — HYDROCODONE BITARTRATE AND ACETAMINOPHEN 1 TABLET: 5; 325 TABLET ORAL at 18:51

## 2023-11-18 RX ADMIN — DEXAMETHASONE SODIUM PHOSPHATE 10 MG: 4 INJECTION, SOLUTION INTRA-ARTICULAR; INTRALESIONAL; INTRAMUSCULAR; INTRAVENOUS; SOFT TISSUE at 07:30

## 2023-11-18 RX ADMIN — SODIUM CHLORIDE, POTASSIUM CHLORIDE, SODIUM LACTATE AND CALCIUM CHLORIDE: 600; 310; 30; 20 INJECTION, SOLUTION INTRAVENOUS at 07:28

## 2023-11-18 RX ADMIN — PROPOFOL 30 MG: 10 INJECTION, EMULSION INTRAVENOUS at 07:30

## 2023-11-18 RX ADMIN — PROCHLORPERAZINE EDISYLATE 5 MG: 5 INJECTION INTRAMUSCULAR; INTRAVENOUS at 02:05

## 2023-11-18 RX ADMIN — KETOROLAC TROMETHAMINE 30 MG: 30 INJECTION, SOLUTION INTRAMUSCULAR; INTRAVENOUS at 17:43

## 2023-11-18 RX ADMIN — METHOCARBAMOL 500 MG: 500 TABLET ORAL at 23:56

## 2023-11-18 RX ADMIN — HYDROCODONE BITARTRATE AND ACETAMINOPHEN 1 TABLET: 5; 325 TABLET ORAL at 10:41

## 2023-11-18 RX ADMIN — OXYCODONE HYDROCHLORIDE 5 MG: 5 TABLET ORAL at 16:07

## 2023-11-18 RX ADMIN — FENTANYL CITRATE 100 MCG: 50 INJECTION INTRAMUSCULAR; INTRAVENOUS at 07:30

## 2023-11-18 RX ADMIN — ACETAMINOPHEN 650 MG: 325 TABLET ORAL at 21:11

## 2023-11-18 RX ADMIN — PROPOFOL 150 MCG/KG/MIN: 10 INJECTION, EMULSION INTRAVENOUS at 07:36

## 2023-11-18 RX ADMIN — HYDROMORPHONE HYDROCHLORIDE 0.2 MG: 1 INJECTION, SOLUTION INTRAMUSCULAR; INTRAVENOUS; SUBCUTANEOUS at 15:19

## 2023-11-18 RX ADMIN — Medication 5 MG: at 00:31

## 2023-11-18 RX ADMIN — HYDROXYZINE HYDROCHLORIDE 25 MG: 25 TABLET, FILM COATED ORAL at 16:07

## 2023-11-18 RX ADMIN — METHOCARBAMOL 500 MG: 500 TABLET ORAL at 17:43

## 2023-11-18 RX ADMIN — PROPOFOL 30 MG: 10 INJECTION, EMULSION INTRAVENOUS at 07:42

## 2023-11-18 RX ADMIN — SODIUM CHLORIDE, POTASSIUM CHLORIDE, SODIUM LACTATE AND CALCIUM CHLORIDE: 600; 310; 30; 20 INJECTION, SOLUTION INTRAVENOUS at 09:54

## 2023-11-18 RX ADMIN — SENNOSIDES AND DOCUSATE SODIUM 2 TABLET: 50; 8.6 TABLET ORAL at 15:12

## 2023-11-18 RX ADMIN — Medication 400 MG: at 15:11

## 2023-11-18 ASSESSMENT — LIFESTYLE VARIABLES: TOBACCO_USE: 1

## 2023-11-18 ASSESSMENT — ACTIVITIES OF DAILY LIVING (ADL)
ADLS_ACUITY_SCORE: 35
ADLS_ACUITY_SCORE: 18
ADLS_ACUITY_SCORE: 18
ADLS_ACUITY_SCORE: 35
ADLS_ACUITY_SCORE: 35
ADLS_ACUITY_SCORE: 18
ADLS_ACUITY_SCORE: 35

## 2023-11-18 NOTE — PHARMACY-ADMISSION MEDICATION HISTORY
Pharmacist Admission Medication History    Admission medication history is complete. The information provided in this note is only as accurate as the sources available at the time of the update.    Information Source(s): Patient and CareEverywhere/SureScripts via in-person    Pertinent Information: Has rx for Chantix but has not yet started.      Changes made to PTA medication list:  Added: acetaminophen, albuterol  Deleted: Ortho-tri-Cyclin, lansoprazole  Changed: None    Medication Affordability:  Not including over the counter (OTC) medications, was there a time in the past 3 months when you did not take your medications as prescribed because of cost?: No    Allergies reviewed with patient and updates made in EHR: yes    Medication History Completed By: Maxim Vieira RPH 11/17/2023 10:25 PM    PTA Med List   Medication Sig Last Dose    acetaminophen (TYLENOL) 500 MG tablet Take 500-1,000 mg by mouth every 6 hours as needed for mild pain 11/16/2023    acyclovir (ZOVIRAX) 400 MG tablet [ACYCLOVIR (ZOVIRAX) 400 MG TABLET] Take 400 mg by mouth 2 (two) times a day as needed. Past Month    albuterol (PROAIR HFA/PROVENTIL HFA/VENTOLIN HFA) 108 (90 Base) MCG/ACT inhaler Inhale 2 puffs into the lungs every 6 hours as needed for shortness of breath, wheezing or cough Past Month

## 2023-11-18 NOTE — H&P
[facility] MEDICINE ADMISSION HISTORY AND PHYSICAL       Assessment & Plan      Present on Admission (POA)    1. [place] pyelonephritis with CT evidence of  Obstructing calculus in the left mid ureter with mild left hydroureteronephrosis.    CT also showed -  Few more focal areas of hypodensity in the left kidney were not definitely seen on prior CT and could represent cysts or areas of lobar   nephronia/developing abscesses.    - continue rocephin  - Renal US -- assess abscess  - pain meds, IVF, NPO,  anti emetics  - [facility] referral     539A --  Renal US -- 3.1 cm complex fluid collection in the mid left kidney may be a complex cyst or abscess.      VTE prophylaxis --  SCDs,   Diet --  NPO  Code Status -- Full,   Barriers to discharge -- Admitting/Acute medical condition/s  Discharge Disposition and goals --  Unable to determine at this point, pending progress/treatment response.     PPE - I was wearing PPE including but not limited to - N95 mask, Gloves, and/or Safety glasses.  Admission Status -- Inpatient     Care plan is based on available information and patient's condition at encounter. Care plan was discussed and agreed to proceed by the patient/family member. Made aware that care plan may change.     I recommend to review/revise history/care plan for information/results not available during my encounter. All or some home medication/s were not resumed or was modified on admission due to safety concerns. Will have rounding AM doc  review safety to resume held/modified home medications.     Availability -- If need to coordinate care after night shift  -- Contact assigned AM rounding Hospitalist.     75 minutes spent by me on the date of service doing chart review, history, exam, diagnostic test results interpretation, documentation & further activities per the note.        Kennedy Domínguez MD, MPH, FACP, [facility]  Internal Medicine - Hospitalist        Chief Complaint Flank pain       HISTORY     - Patient was seen in ED - 18    - She is here for left flank pain and unable to sleep for 2 days with pain. She has urinary symptoms such as dysuria. No vaginal discharge. No fevers    - ED course/treatments/referral - CT showed      1.  Obstructing calculus in the left mid ureter with mild left hydroureteronephrosis.    2.  Patchy bilateral renal hypoenhancement, left greater than right, compatible with pyelonephritis. Few more focal areas of hypodensity in the left kidney were not definitely seen on prior CT and could represent cysts or areas of lobar   nephronia/developing abscesses.    - She was given Rocephin and was referred to [facility] --- No headache. No dizziness. No weakness. No CP or SOB. No palpitations. No abdominal pain. No nausea or vomiting. No urinary symptoms. No bleeding symptoms. No weight loss. Rest of 12 point ROS was reviewed and negative.       Past Medical History     History reviewed. No pertinent past medical history.      Surgical History     Past Surgical History:   Procedure Laterality Date    CHOLECYSTECTOMY      CHOLECYSTECTOMY      1 year ago     COLON SURGERY      polyp removal 2 1/2 years ago     KIDNEY STONE SURGERY      1 1/2 year ago at [facility]     TONSILLECTOMY          Family History      Family History   Problem Relation Age of Onset    No Known Problems Mother     No Known Problems Father          Social History      .  Social History     Socioeconomic History    Marital status: Single     Spouse name: Not on file    Number of children: Not on file    Years of education: Not on file    Highest education level: Not on file   Occupational History    Not on file   Tobacco Use    Smoking status: Former     Types: Cigarettes     Quit date: 10/27/2009     Years since quittin.0    Smokeless tobacco: Current   Substance and Sexual Activity    Alcohol use: Yes     Alcohol/week: 0.0 standard drinks of alcohol     Comment: Alcoholic Drinks/day: 2-3  drinks about 5 days a week    Drug use: No    Sexual activity: Yes     Partners: Male   Other Topics Concern    Not on file   Social History Narrative    Not on file     Social Determinants of Health     Financial Resource Strain: Not on file   Food Insecurity: Not on file   Transportation Needs: Not on file   Physical Activity: Not on file   Stress: Not on file   Social Connections: Not on file   Interpersonal Safety: Not on file   Housing Stability: Not on file          Allergies      No Known Allergies      Prior to Admission Medications      No current facility-administered medications on file prior to encounter.  acetaminophen (TYLENOL) 500 MG tablet, Take 500-1,000 mg by mouth every 6 hours as needed for mild pain  acyclovir (ZOVIRAX) 400 MG tablet, [ACYCLOVIR (ZOVIRAX) 400 MG TABLET] Take 400 mg by mouth 2 (two) times a day as needed.  albuterol (PROAIR HFA/PROVENTIL HFA/VENTOLIN HFA) 108 (90 Base) MCG/ACT inhaler, Inhale 2 puffs into the lungs every 6 hours as needed for shortness of breath, wheezing or cough  varenicline (CHANTIX ALYSSA) 0.5 MG X 11 & 1 MG X 42 tablet, Take 1 mg by mouth 2 times daily Take 0.5 mg tab daily for 3 days, THEN 0.5 mg tab twice daily for 4 days, THEN 1 mg twice daily.            Review of Systems     A 12 point comprehensive review of systems was negative except as noted above in HPI.    PHYSICAL EXAMINATION       Vitals      Vitals: BP 95/57   Pulse 93   Temp 98.4  F (36.9  C) (Temporal)   Resp 16   Ht 1.524 m (5')   Wt 47.6 kg (105 lb)   LMP 10/31/2023 (Approximate)   SpO2 100%   BMI 20.51 kg/m    BMI= Body mass index is 20.51 kg/m .      Examination     General Appearance:  Alert, cooperative, no distress  Head:    Normocephalic, without obvious abnormality, atraumatic  EENT:  PERRL, conjunctiva/corneas clear, EOM's intact.   Neck:   Supple, symmetrical, trachea midline, no adenopathy; no NVE  Back:  Symmetric, no curvature, no CVA tenderness  Chest/Lungs: Clear to  auscultation bilaterally, respirations unlabored, No tenderness or deformity. No abdominal breathing or use of accessory muscles.   Heart:    Regular rate and rhythm, S1 and S2 normal, no murmur, rub   or gallop  Abdomen: Soft, non-tender, bowel sounds active all four quadrants, not peritoneal on palpation. Not distended - left flank pain   Extremities:  Extremities normal, atraumatic, no swelling   Skin:  Skin color, texture, turgor normal, no rashes or lesion  Neurologic:  Awake and alert, No lateralizing or localizing signs             Pertinent Lab     Results for orders placed or performed during the hospital encounter of 11/17/23   CT Abdomen Pelvis w Contrast    Impression    IMPRESSION:   1.  Obstructing calculus in the left mid ureter with mild left hydroureteronephrosis.  2.  Patchy bilateral renal hypoenhancement, left greater than right, compatible with pyelonephritis. Few more focal areas of hypodensity in the left kidney were not definitely seen on prior CT and could represent cysts or areas of lobar   nephronia/developing abscesses.   Comprehensive metabolic panel   Result Value Ref Range    Sodium 136 135 - 145 mmol/L    Potassium 4.2 3.4 - 5.3 mmol/L    Carbon Dioxide (CO2) 26 22 - 29 mmol/L    Anion Gap 9 7 - 15 mmol/L    Urea Nitrogen 15.1 6.0 - 20.0 mg/dL    Creatinine 0.87 0.51 - 0.95 mg/dL    GFR Estimate 85 >60 mL/min/1.73m2    Calcium 9.2 8.6 - 10.0 mg/dL    Chloride 101 98 - 107 mmol/L    Glucose 90 70 - 99 mg/dL    Alkaline Phosphatase 59 40 - 150 U/L    AST 13 0 - 45 U/L    ALT <5 0 - 50 U/L    Protein Total 6.5 6.4 - 8.3 g/dL    Albumin 3.8 3.5 - 5.2 g/dL    Bilirubin Total 0.3 <=1.2 mg/dL   UA with Microscopic reflex to Culture    Specimen: Urine, Clean Catch   Result Value Ref Range    Color Urine Dark Yellow (A) Colorless, Straw, Light Yellow, Yellow    Appearance Urine Turbid (A) Clear    Glucose Urine Negative Negative mg/dL    Bilirubin Urine Negative Negative    Ketones Urine  Negative Negative mg/dL    Specific Gravity Urine 1.007 1.001 - 1.030    Blood Urine 1.0 mg/dL (A) Negative    pH Urine 5.5 5.0 - 7.0    Protein Albumin Urine 20 (A) Negative mg/dL    Urobilinogen Urine <2.0 <2.0 mg/dL    Nitrite Urine Positive (A) Negative    Leukocyte Esterase Urine 500 Stephen/uL (A) Negative    Bacteria Urine Many (A) None Seen /HPF    WBC Clumps Urine Present (A) None Seen /HPF    RBC Urine 22 (H) <=2 /HPF    WBC Urine 127 (H) <=5 /HPF    Squamous Epithelials Urine <1 <=1 /HPF   Result Value Ref Range    CRP Inflammation 78.80 (H) <5.00 mg/L   HCG qualitative urine   Result Value Ref Range    hCG Urine Qualitative Negative Negative   CBC with platelets and differential   Result Value Ref Range    WBC Count 13.7 (H) 4.0 - 11.0 10e3/uL    RBC Count 4.03 3.80 - 5.20 10e6/uL    Hemoglobin 11.4 (L) 11.7 - 15.7 g/dL    Hematocrit 34.8 (L) 35.0 - 47.0 %    MCV 86 78 - 100 fL    MCH 28.3 26.5 - 33.0 pg    MCHC 32.8 31.5 - 36.5 g/dL    RDW 12.2 10.0 - 15.0 %    Platelet Count 180 150 - 450 10e3/uL    % Neutrophils 87 %    % Lymphocytes 5 %    % Monocytes 7 %    % Eosinophils 0 %    % Basophils 0 %    % Immature Granulocytes 1 %    NRBCs per 100 WBC 0 <1 /100    Absolute Neutrophils 11.9 (H) 1.6 - 8.3 10e3/uL    Absolute Lymphocytes 0.6 (L) 0.8 - 5.3 10e3/uL    Absolute Monocytes 0.9 0.0 - 1.3 10e3/uL    Absolute Eosinophils 0.0 0.0 - 0.7 10e3/uL    Absolute Basophils 0.1 0.0 - 0.2 10e3/uL    Absolute Immature Granulocytes 0.1 <=0.4 10e3/uL    Absolute NRBCs 0.0 10e3/uL   Lactic acid whole blood   Result Value Ref Range    Lactic Acid 0.5 (L) 0.7 - 2.0 mmol/L           Pertinent Radiology

## 2023-11-18 NOTE — ANESTHESIA CARE TRANSFER NOTE
Patient: Danielle Jarrell    Procedure: Procedure(s):  CYSTOSCOPY, WITH LEFT URETERAL STENT INSERTION       Diagnosis: Calculus of kidney [N20.0]  Lower urinary tract infectious disease [N39.0]  Diagnosis Additional Information: No value filed.    Anesthesia Type:   MAC     Note:    Oropharynx: oropharynx clear of all foreign objects  Level of Consciousness: awake  Oxygen Supplementation: room air    Independent Airway: airway patency satisfactory and stable  Dentition: dentition unchanged  Vital Signs Stable: post-procedure vital signs reviewed and stable  Report to RN Given: handoff report given  Patient transferred to: PACU    Handoff Report: Identifed the Patient, Identified the Reponsible Provider, Reviewed the pertinent medical history, Discussed the surgical course, Reviewed Intra-OP anesthesia mangement and issues during anesthesia, Set expectations for post-procedure period and Allowed opportunity for questions and acknowledgement of understanding      Vitals:  Vitals Value Taken Time   BP 96/55 11/18/23 0905   Temp 36.4  C (97.5  F) 11/18/23 0855   Pulse 84 11/18/23 0905   Resp     SpO2 99 % 11/18/23 0905       Electronically Signed By: RORY Taylor CRNA  November 18, 2023  9:10 AM

## 2023-11-18 NOTE — PROGRESS NOTES
[facility]    Medicine Progress Note - Hospitalist Service    Date of Admission:  11/17/2023    Assessment & Plan   43-year-old female with history of ovarian cysts, presenting on 11/17/2023 with left flank pain.  CT and ultrasound consistent with infected left ureteral cyst, pyelonephritis, and possible developing abscess in the left kidney.  Patient underwent cystoscopy with ureteral stent insertion on 11/18/2023      Infected left ureteral stone  Pyelonephritis  Mild left hydroureteronephrosis  Possible developing abscess in left kidney  Sepsis secondary to UTI  Status post left ureteral stent placement.  Urine culture growing E.coli. Sensitivities pending.   Await cultures for narrowing spectrum antibiotic coverage  Continue ceftriaxone 2 g IV every 24 hours  Order vancomycin pharmacy to dose  IV fluids per sepsis protocol.  Appreciate urology's expertise.    Tobacco dependence  Plan to start Chantix at discharge.     Anxiety  OCD    History of ovarian cyst        Diet: NPO for Medical/Clinical Reasons Except for: Meds, Ice Chips    DVT Prophylaxis: Low Risk/Ambulatory with no VTE prophylaxis indicated  Painter Catheter: Not present  Lines: None     Cardiac Monitoring: None  Code Status: Full Code      Clinically Significant Risk Factors Present on Admission              # Hypoalbuminemia: Lowest albumin = 3.1 g/dL at 11/18/2023  5:31 AM, will monitor as appropriate                     Disposition Plan      Expected Discharge Date: 11/19/2023                    Marcel Waite DO  Hospitalist Service  [facility]  Securely message with [place] (more info)  Text page via [employer] Paging/Directory   ______________________________________________________________________    Interval History   Patient reports flank tenderness.  No fever or chills.     Physical Exam   Vital Signs: Temp: 98.4  F (36.9  C) Temp src: Temporal BP: 97/55 Pulse: 106   Resp: 16 SpO2: 98 % O2  Device: None (Room air)    Weight: 105 lbs 0 oz    General Appearance:  Alert, cooperative, no distress  Head:    Normocephalic, without obvious abnormality, atraumatic  EENT:  PERRL, conjunctiva/corneas clear, EOM's intact.   Neck:    Supple, symmetrical, trachea midline, no adenopathy; no NVE  Back:  Symmetric, no curvature, no CVA tenderness  Chest/Lungs: Clear to auscultation bilaterally, respirations unlabored, No tenderness or deformity. No abdominal breathing or use of accessory muscles.   Heart:    Regular rate and rhythm, S1 and S2 normal, no murmur, rub   or gallop  Abdomen: Soft, non-tender, bowel sounds active all four quadrants, not peritoneal on palpation. Not distended - left flank pain   Extremities:  Extremities normal, atraumatic, no swelling   Skin:  Skin color, texture, turgor normal, no rashes or lesion  Neurologic:  Awake and alert, No lateralizing or localizing signs     Medical Decision Making       50 MINUTES SPENT BY ME on the date of service doing chart review, history, exam, documentation & further activities per the note.      Data     I have personally reviewed the following data over the past 24 hrs:    19.2 (H)  \   10.4 (L)   / 156     138 107 13.1 /  98   3.4 23 0.80 \     ALT: 15 AST: 23 AP: 62 TBILI: 0.3   ALB: 3.1 (L) TOT PROTEIN: 5.3 (L) LIPASE: N/A     Procal: N/A CRP: 78.80 (H) Lactic Acid: 0.5 (L)         Imaging results reviewed over the past 24 hrs:   Recent Results (from the past 24 hour(s))   CT Abdomen Pelvis w Contrast    Narrative    EXAM: CT ABDOMEN PELVIS W CONTRAST  LOCATION: [facility]  DATE: 11/17/2023    INDICATION: Left flank pain.  COMPARISON: CT abdomen/pelvis 04/11/2019.  TECHNIQUE: CT scan of the abdomen and pelvis was performed following injection of IV contrast. Multiplanar reformats were obtained. Dose reduction techniques were used.  CONTRAST: Yes.    FINDINGS:   LOWER CHEST: Normal.    HEPATOBILIARY: Tiny right hepatic  hypodensity is too small to characterize. Prior cholecystectomy. Mild prominence of the extrahepatic ducts likely related to postcholecystectomy state.    PANCREAS: Normal.    SPLEEN: Normal.    ADRENAL GLANDS: Normal.    KIDNEYS/BLADDER: There is a 0.4 cm obstructing calculus in the left mid ureter (series 5, image 35). Mild left hydroureteronephrosis. Asymmetric left urothelial thickening and enhancement. Patchy areas of bilateral renal cortical hypoenhancement, left   greater than right. Left renal cyst. There are more focal areas of hypodensity within the left kidney (series 3, images 69, 70, and 73 for example) not definitely seen on prior CT, which could represent cysts or areas of lobar nephronia/developing   abscesses. Stable right renal hypodensities favored for cysts. Mildly distended urinary bladder is unremarkable.    BOWEL: No bowel obstruction. Normal appendix. Trace pelvic ascites. No pneumoperitoneum.    LYMPH NODES: Normal.    VASCULATURE: Normal caliber abdominal aorta without significant atherosclerotic plaque.    PELVIC ORGANS: Right corpus luteum.    MUSCULOSKELETAL: Thoracolumbar spondylosis. No destructive osseous lesion. Partially visualized bilateral breast implants.      Impression    IMPRESSION:   1.  Obstructing calculus in the left mid ureter with mild left hydroureteronephrosis.  2.  Patchy bilateral renal hypoenhancement, left greater than right, compatible with pyelonephritis. Few more focal areas of hypodensity in the left kidney were not definitely seen on prior CT and could represent cysts or areas of lobar   nephronia/developing abscesses.   US Kidney Left    Narrative    EXAM: US KIDNEY LEFT  LOCATION: [facility]  DATE: 11/18/2023    INDICATION: evaluate for renal abscess, abnormal CT  COMPARISON: CT 11/17/2023.  TECHNIQUE: Routine Left Renal and Bladder Ultrasound.    FINDINGS:    LEFT KIDNEY: 11.1 x 5.7 x 5.8 cm. There is mild dilatation of the left  renal collecting system. There is a complex fluid collection in the mid kidney measuring 2.6 x 3.1 x 2.0 cm. No other focal renal lesions are evident.     BLADDER: Not imaged.      Impression    IMPRESSION:  3.1 cm complex fluid collection in the mid left kidney may be a complex cyst or abscess.

## 2023-11-18 NOTE — OP NOTE
OPERATIVE NOTE    PREOPERATIVE DIAGNOSIS: Left ureteral stone and left pyelonephritis  POSTOPERATIVE DIAGNOSIS: Same    PROCEDURE: Cystoscopy, left retrograde pyelogram with fluoroscopic interpretation and left ureteral stent placement (6 [place]i by 24 cm)    SURGEON:  Chauncey Holland MD     ASSISTANT: None    ANESTHESIA: MAC    INDICATIONS: 43-year-old woman with infected left ureteral stone    PROCEDURE: After informed consent was obtain and pre-operative antibiotics were given the patient was taken to the operating room. After adequate anesthesia she was placed in the lithotomy position with all pressure points padded.    The perineum was prepped and draped in usual sterile fashion    A rigid cystoscope was inserted through a well-lubricated urethra and into the bladder.  The urine in the bladder was clear.  The bladder was normal in appearance.  The left ureteral orifice was cannulated with a 5 Kuwaiti open-ended exchange catheter and the catheter was easily passed up into the area of the renal pelvis.  A urine culture was collected from the left renal pelvis.    A low volume left retrograde pyelogram was performed to confirm the location of the catheter.  The stone was not visualized.  A wire was then coiled in the upper pole and a 6 [place]i by 24 cm double-J ureteral stent was passed over the wire with the upper coil in the upper pole and the lower coil in the bladder.    At the conclusion of the procedure she was awakened from anesthesia, transferrred to a gurney and to the PACU in stable condition.    TUBES/DRAINS:    SPECIMENS: Left renal pelvis urine for culture    COMPLICATIONS:  None.    EBL: None    Plan: Admit to the hospitalist service for IV antibiotics and await culture results so that culture directed oral antibiotic therapy can be initiated.  I will arrange for outpatient follow-up for stone removal in the near future.  I recommend a total of 2 weeks of oral antibiotics.  No Painter catheter is  necessary.    As the attending surgeon I, Chauncey Holland, was present and scrubbed throughout the procedure.

## 2023-11-18 NOTE — ED TRIAGE NOTES
Patient believes she may have a UTI due to burning when urinating and frequency. Patient also reporting L flank pain that started 3 days ago. Hx of kidney stones and a UTI 4 months ago. Patient also reported fever at home. Took tylenol, ibuprofen, and naproxen about 1 hour ago.     Triage Assessment (Adult)       Row Name 11/17/23 1955          Triage Assessment    Airway WDL WDL        Respiratory WDL    Respiratory WDL WDL        Skin Circulation/Temperature WDL    Skin Circulation/Temperature WDL WDL        Cardiac WDL    Cardiac WDL WDL        Peripheral/Neurovascular WDL    Peripheral Neurovascular WDL WDL        Cognitive/Neuro/Behavioral WDL    Cognitive/Neuro/Behavioral WDL WDL

## 2023-11-18 NOTE — ANESTHESIA PREPROCEDURE EVALUATION
Anesthesia Pre-Procedure Evaluation    Patient: Danielle Jarrell   MRN: [id number] : 1980        Procedure : Procedure(s):  CYSTOSCOPY, WITH URETERAL STENT INSERTION          History reviewed. No pertinent past medical history.   Past Surgical History:   Procedure Laterality Date    CHOLECYSTECTOMY      CHOLECYSTECTOMY      1 year ago     COLON SURGERY      polyp removal 2 1/2 years ago     KIDNEY STONE SURGERY      1 1/2 year ago at [facility]     TONSILLECTOMY        No Known Allergies   Social History     Tobacco Use    Smoking status: Former     Types: Cigarettes     Quit date: 10/27/2009     Years since quittin.0    Smokeless tobacco: Current   Substance Use Topics    Alcohol use: Yes     Alcohol/week: 0.0 standard drinks of alcohol     Comment: Alcoholic Drinks/day: 2-3 drinks about 5 days a week      Wt Readings from Last 1 Encounters:   23 47.6 kg (105 lb)        Anesthesia Evaluation            ROS/MED HX  ENT/Pulmonary:     (+)                tobacco use, Current use,                      Neurologic:     (+)      migraines,                          Cardiovascular:  - neg cardiovascular ROS     METS/Exercise Tolerance:     Hematologic:     (+)      anemia,          Musculoskeletal:       GI/Hepatic:    (-) GERD   Renal/Genitourinary:     (+)       Nephrolithiasis ,       Endo:  - neg endo ROS     Psychiatric/Substance Use:     (+) psychiatric history anxiety (ADHD)       Infectious Disease: Comment: Pyelonephritis     (+) Recent Fever,           Malignancy:  - neg malignancy ROS     Other:  - neg other ROS          Physical Exam    Airway  airway exam normal      Mallampati: II   TM distance: > 3 FB   Neck ROM: full   Mouth opening: > 3 cm    Respiratory Devices and Support         Dental  no notable dental history     (+) Modest Abnormalities - crowns, retainers, 1 or 2 missing teeth      Cardiovascular   cardiovascular exam normal       Rhythm and rate: regular and normal  "    Pulmonary   pulmonary exam normal        breath sounds clear to auscultation           OUTSIDE LABS:  CBC:   Lab Results   Component Value Date    WBC 19.2 (H) 11/18/2023    WBC 13.7 (H) 11/17/2023    HGB 10.4 (L) 11/18/2023    HGB 11.4 (L) 11/17/2023    HCT 31.7 (L) 11/18/2023    HCT 34.8 (L) 11/17/2023     11/18/2023     11/17/2023     BMP:   Lab Results   Component Value Date     11/18/2023     11/17/2023    POTASSIUM 3.4 11/18/2023    POTASSIUM 4.2 11/17/2023    CHLORIDE 107 11/18/2023    CHLORIDE 101 11/17/2023    CO2 23 11/18/2023    CO2 26 11/17/2023    BUN 13.1 11/18/2023    BUN 15.1 11/17/2023    CR 0.80 11/18/2023    CR 0.87 11/17/2023    GLC 98 11/18/2023    GLC 90 11/17/2023     COAGS: No results found for: \"PTT\", \"INR\", \"FIBR\"  POC:   Lab Results   Component Value Date    HCG Negative 11/17/2023     HEPATIC:   Lab Results   Component Value Date    ALBUMIN 3.1 (L) 11/18/2023    PROTTOTAL 5.3 (L) 11/18/2023    ALT 15 11/18/2023    AST 23 11/18/2023    ALKPHOS 62 11/18/2023    BILITOTAL 0.3 11/18/2023     OTHER:   Lab Results   Component Value Date    LACT 0.5 (L) 11/17/2023    KALEB 8.0 (L) 11/18/2023    MAG 1.8 11/17/2023       Anesthesia Plan    ASA Status:  2, emergent    NPO Status:  NPO Appropriate    Anesthesia Type: MAC.     - Reason for MAC: straight local not clinically adequate              Consents    Anesthesia Plan(s) and associated risks, benefits, and realistic alternatives discussed. Questions answered and patient/representative(s) expressed understanding.     - Discussed:     - Discussed with:  Patient            Postoperative Care    Pain management: IV analgesics, Oral pain medications, Multi-modal analgesia.   PONV prophylaxis: Ondansetron (or other 5HT-3), Dexamethasone or Solumedrol, Droperidol or Haldol     Comments:                Asael Davis MD  "

## 2023-11-18 NOTE — PHARMACY-VANCOMYCIN DOSING SERVICE
Pharmacy Vancomycin Initial Note  Date of Service 2023  Patient's  1980  43 year old, female    Indication: Sepsis and Urinary Tract Infection    Current estimated CrCl = Estimated Creatinine Clearance: 68.8 mL/min (based on SCr of 0.8 mg/dL).    Creatinine for last 3 days  2023:  8:51 PM Creatinine 0.87 mg/dL  2023:  5:31 AM Creatinine 0.80 mg/dL    Recent Vancomycin Level(s) for last 3 days  No results found for requested labs within last 3 days.      Vancomycin IV Administrations (past 72 hours)        No vancomycin orders with administrations in past 72 hours.                    Nephrotoxins and other renal medications (From now, onward)      Start     Dose/Rate Route Frequency Ordered Stop    23 0000  vancomycin (VANCOCIN) 750 mg in 0.9% NaCl 250 mL intermittent infusion         750 mg  over 60 Minutes Intravenous EVERY 12 HOURS 23 1103      23 1200  vancomycin (VANCOCIN) 1,000 mg in NaCl 0.9% 200 mL intermittent infusion         1,000 mg  over 60 Minutes Intravenous ONCE 23 1103              Contrast Orders - past 72 hours (72h ago, onward)      Start     Dose/Rate Route Frequency Stop    23 0804  iohexol (OMNIPAQUE) 300 mg/mL injection  Status:  Discontinued           PRN 23 0839    23 2130  iopamidol (ISOVUE-370) solution 100 mL         100 mL Intravenous ONCE 23            [employer] Prediction of Planned Initial Vancomycin Regimen  Loading dose: 1000 mg at 12:00 2023.  Regimen: 750 mg IV every 12 hours.  Start time: 11:17 on 2023  Exposure target: AUC24 (range)400-600 mg/L.hr   AUC24,ss: 509 mg/L.hr  Probability of AUC24 > 400: 75 %  Ctrough,ss: 15.6 mg/L  Probability of Ctrough,ss > 20: 30 %  Probability of nephrotoxicity (Lodise ARAMIS ): 11 %          Plan:  Start vancomycin  1000mg IV x 1 then 750mg  mg IV q12h.   Vancomycin monitoring method: AUC  Vancomycin therapeutic monitoring goal: 400-600  mg*h/L  Pharmacy will check vancomycin levels as appropriate in 1-3 Days.    Serum creatinine levels will be ordered daily for the first week of therapy and at least twice weekly for subsequent weeks.      Meagan Winters RPH

## 2023-11-18 NOTE — ED PROVIDER NOTES
EMERGENCY DEPARTMENT ENCOUNTER      NAME: Danielle Jarrell  AGE: 43 year old female  YOB: 1980  MRN: [id number]  EVALUATION DATE & TIME: No admission date for patient encounter.    PCP: No Ref-Primary, Physician    ED PROVIDER: Danielle Quick PA-C    Chief Complaint   Patient presents with    Flank Pain    Rule out Urinary Tract Infection     FINAL IMPRESSION:  1. Pyelonephritis of left kidney    2. Calculus of kidney    3. UTI (lower urinary tract infection)    4. Left flank pain    5. Left ureteral stone      MEDICAL DECISION MAKING:    Pertinent Labs & Imaging studies reviewed. (See chart for details)  Danielle Jarrell is a 43 year old female who presents for evaluation of flank pain.  Patient reports 2-day history of left flank pain, dysuria, urinary frequency, fever, nausea and chills.  History of UTI and feels similar to previous.  History of kidney stone, however reports this does not feel similar to that.  Denies chest pain, shortness of breath, abdominal pain, constipation, vaginal bleeding, vaginal discharge or any other concerning symptoms.  History of cholecystectomy.     On my initial evaluation, vital signs normal. On physical exam patient is uncomfortable and mildly ill appearing, but not toxic.  Heart sounds are normal, lungs are clear without wheezing or crackles.  No abdominal tenderness on palpation, no distention, rebound, guarding or masses.  Mild left CVA tenderness, no skin rash or lesions or flank ecchymosis..    Differential diagnosis includes UTI, pyelonephritis, nephrolithiasis, shingles, ovarian cyst rupture, ovarian torsion, tubo-ovarian abscess, pregnancy, diverticulitis, appendicitis, small bowel obstruction, large bowel obstruction, pancreatitis, cholecystitis, cholelithiasis, hepatitis, gastritis, esophagitis,. Emergency department workup included basic labs in addition to UA, CRP, CT abdomen pelvis.  Patient was given IV fluids and IV Dilaudid as she recently took  ibuprofen and Tylenol, so unable to give IV Toradol. Patient declined needing medication for nausea.    CT notable for obstructing calculus in the left mid ureter with mild left hydroureteronephrosis. Also shows patchy bilateral renal hypoenhancement, left greater than right, compatible with pyelonephritis. Few more focal areas of hypodensity in the left kidney were not definitely seen on prior CT and could represent cysts or areas of lobar nephronia/developing abscesses. Mild leukocytosis of 13.7.  Urine is nitrite positive as well as many leukocyte esterase and bacteria.  Started on IV Rocephin.   CRP is elevated to 78.  Lactate is normal.  I spoke with [facility] Dr. Holland, who recommends hospital admission for stent placement.  Agree with IV Rocephin, recommended adding blood cultures even though no Rocephin was already given here today in the ER.    Patient will need hospital admission for ongoing management of pyelonephritis and ureteral stone.  Spoke with hospitalist, Dr. Domínguez accepts the patient for admission.  Patient updated on plan for surgery and hospital admission, verbalized understanding and is in agreement with this plan.  Hemodynamically stable on admission.    Medical Decision Making    History:  Supplemental history from: N/A  External Record(s) reviewed: Documented in chart, if applicable.    Work Up:  Chart documentation includes differential considered and any EKGs or imaging independently interpreted by provider, where specified.  In additional to work up documented, I considered the following work up: Documented in chart, if applicable.    External consultation:  Discussion of management with another provider: Hospitalist and Other: [facility]    Complicating factors:  Care impacted by chronic illness: Mental Health, Smoking / Nicotine Use, and Other: hypokalemia, calculus of kidney  Care affected by social determinants of health: Alcohol Abuse and/or Recreational Drug Use    Disposition  considerations: Admit.    ED COURSE:  8:50 PM I reviewed the patient's chart. I met with the patient to gather history and to perform my initial exam.   10:00 PM I paged [facility].  10:04 PM I rechecked the patient and updated her on results.  10:29 PM I spoke to HUMA Jett.  10:33 PM I paged admitting.  10:34 PM I staffed this patient case with Dr. Michelle who agrees with plan at this time and will see the patient for their history, exam, and complete evaluation.  10:58 PM I spoke to Dr. Domínguez, hospitalist, who agrees to take the patient.    At the conclusion of the encounter I discussed the results of all of the tests and the disposition. The questions were answered. The patient or family acknowledged understanding and was agreeable with the care plan.     Voice recognition software was used in the creation of this note. Any grammatical or nonsensical errors are due to inherent errors with the software and are not the intention of the writer.     MEDICATIONS GIVEN IN THE EMERGENCY:  Medications   cefTRIAXone (ROCEPHIN) 1 g vial to attach to  mL bag for ADULTS or NS 50 mL bag for PEDS (has no administration in time range)   lidocaine 1 % 0.1-1 mL (has no administration in time range)   lidocaine (LMX4) cream (has no administration in time range)   sodium chloride (PF) 0.9% PF flush 3 mL (has no administration in time range)   sodium chloride (PF) 0.9% PF flush 3 mL (has no administration in time range)   senna-docusate (SENOKOT-S/PERICOLACE) 8.6-50 MG per tablet 1 tablet (has no administration in time range)     Or   senna-docusate (SENOKOT-S/PERICOLACE) 8.6-50 MG per tablet 2 tablet (has no administration in time range)   calcium carbonate (TUMS) chewable tablet 1,000 mg (has no administration in time range)   sodium chloride 0.9 % infusion (has no administration in time range)   acetaminophen (TYLENOL) tablet 650 mg (has no administration in time range)     Or   acetaminophen (TYLENOL) Suppository 650 mg  "(has no administration in time range)   melatonin tablet 5 mg (has no administration in time range)   HYDROmorphone (PF) (DILAUDID) injection 0.2 mg (has no administration in time range)   HYDROcodone-acetaminophen (NORCO) 5-325 MG per tablet 1 tablet (has no administration in time range)   hydrOXYzine (ATARAX) tablet 25 mg (has no administration in time range)   sodium chloride 0.9% BOLUS 1,000 mL (0 mLs Intravenous Stopped 11/17/23 2325)   HYDROmorphone (PF) (DILAUDID) injection 0.5 mg (0.5 mg Intravenous $Given 11/17/23 2133)   cefTRIAXone (ROCEPHIN) 1 g vial to attach to  mL bag for ADULTS or NS 50 mL bag for PEDS (0 g Intravenous Stopped 11/17/23 2256)   iopamidol (ISOVUE-370) solution 100 mL (100 mLs Intravenous $Given 11/17/23 2119)     NEW PRESCRIPTIONS STARTED AT TODAY'S ER VISIT  New Prescriptions    No medications on file     =================================================================    HPI:    Patient information was obtained from: Patient    Use of Interpretor: N/A     Danielle Jarrell is a 43 year old female with a pertinent history of ovarian cysts, UTI, kidney stones, OCD, and anxiety who presents to this ED via walk-in for evaluation of flank pain and urinary burning and frequency.    Patient reports left sided back and flank pain that started 2 days ago. She said the pain is constant and dull but becomes sharp with movement. She also notes urinary burning and frequency, chills, and a subjective fever over past couple days as well. She also had diarrhea yesterday and vomiting last night. Patient reports taking tylenol, ibuprofen, Asprin, naproxen, and uristat about an hour ago. She reports a history of kidney stones, but denies these symptoms feeling similar, and a UTI that started 4 months ago and ended last month. Reports she was seen and given abx for a uti last month, but \"took the pills wrong and the infection never cleared\". She also has a history of gall bladder removal and " surgery to remove kidney stone from duct. Otherwise, patient denies any abdominal pain, blood in urine, unusual vaginal bleeding or discharge, or current nausea. No other complaints at this time.     REVIEW OF SYSTEMS:  Review of Systems   Constitutional:  Positive for chills, fatigue and fever.   Gastrointestinal:  Positive for diarrhea and vomiting. Negative for abdominal pain and nausea.   Genitourinary:  Positive for dysuria, flank pain (left) and frequency. Negative for hematuria, vaginal bleeding and vaginal discharge.   Musculoskeletal:  Positive for back pain (left).   All other systems reviewed and are negative.       PAST MEDICAL HISTORY:  History reviewed. No pertinent past medical history.    PAST SURGICAL HISTORY:  Past Surgical History:   Procedure Laterality Date    CHOLECYSTECTOMY      CHOLECYSTECTOMY      1 year ago     COLON SURGERY      polyp removal 2 1/2 years ago     KIDNEY STONE SURGERY      1 1/2 year ago at [facility]     TONSILLECTOMY         CURRENT MEDICATIONS:      Current Facility-Administered Medications:     acetaminophen (TYLENOL) tablet 650 mg, 650 mg, Oral, Q4H PRN **OR** acetaminophen (TYLENOL) Suppository 650 mg, 650 mg, Rectal, Q4H PRN, Nicola Domínguez MD    calcium carbonate (TUMS) chewable tablet 1,000 mg, 1,000 mg, Oral, 4x Daily PRN, Nicola Domínguez MD    cefTRIAXone (ROCEPHIN) 1 g vial to attach to  mL bag for ADULTS or NS 50 mL bag for PEDS, 1 g, Intravenous, Q24H, Chauncey Holland MD    HYDROcodone-acetaminophen (NORCO) 5-325 MG per tablet 1 tablet, 1 tablet, Oral, Q4H PRN, Nicola Domínguez MD    HYDROmorphone (PF) (DILAUDID) injection 0.2 mg, 0.2 mg, Intravenous, Q4H PRN, Nicola Domínguez MD    hydrOXYzine (ATARAX) tablet 25 mg, 25 mg, Oral, TID PRN, Nicola Domínguez MD    lidocaine (LMX4) cream, , Topical, Q1H PRN, Nicola Domínguez MD    lidocaine 1 % 0.1-1 mL, 0.1-1 mL, Other, Q1H PRN, Nicola Domínguez MD    melatonin tablet 5 mg, 5 mg,  Oral, At Bedtime PRN, Nicola Domínguez MD    senna-docusate (SENOKOT-S/PERICOLACE) 8.6-50 MG per tablet 1 tablet, 1 tablet, Oral, BID PRN **OR** senna-docusate (SENOKOT-S/PERICOLACE) 8.6-50 MG per tablet 2 tablet, 2 tablet, Oral, BID PRN, Nicola Domínguez MD    sodium chloride (PF) 0.9% PF flush 3 mL, 3 mL, Intracatheter, Q8H, Nicola Domínguez MD    sodium chloride (PF) 0.9% PF flush 3 mL, 3 mL, Intracatheter, q1 min prn, Nicola Domínguez MD    sodium chloride 0.9 % infusion, , Intravenous, Continuous, Nicola Domínguez MD    Current Outpatient Medications:     acetaminophen (TYLENOL) 500 MG tablet, Take 500-1,000 mg by mouth every 6 hours as needed for mild pain, Disp: , Rfl:     acyclovir (ZOVIRAX) 400 MG tablet, [ACYCLOVIR (ZOVIRAX) 400 MG TABLET] Take 400 mg by mouth 2 (two) times a day as needed., Disp: , Rfl:     albuterol (PROAIR HFA/PROVENTIL HFA/VENTOLIN HFA) 108 (90 Base) MCG/ACT inhaler, Inhale 2 puffs into the lungs every 6 hours as needed for shortness of breath, wheezing or cough, Disp: , Rfl:     varenicline (CHANTIX ALYSSA) 0.5 MG X 11 & 1 MG X 42 tablet, Take 1 mg by mouth 2 times daily Take 0.5 mg tab daily for 3 days, THEN 0.5 mg tab twice daily for 4 days, THEN 1 mg twice daily., Disp: , Rfl:     ALLERGIES:  No Known Allergies    FAMILY HISTORY:  Family History   Problem Relation Age of Onset    No Known Problems Mother     No Known Problems Father        SOCIAL HISTORY:   Social History     Socioeconomic History    Marital status: Single   Tobacco Use    Smoking status: Former     Types: Cigarettes     Quit date: 10/27/2009     Years since quittin.0    Smokeless tobacco: Current   Substance and Sexual Activity    Alcohol use: Yes     Alcohol/week: 0.0 standard drinks of alcohol     Comment: Alcoholic Drinks/day: 2-3 drinks about 5 days a week    Drug use: No    Sexual activity: Yes     Partners: Male       VITALS:  Patient Vitals for the past 24 hrs:   BP Temp Temp src Pulse Resp SpO2  Height Weight   11/17/23 2300 106/57 -- -- 92 -- 100 % -- --   11/17/23 2230 95/57 -- -- 93 -- 100 % -- --   11/17/23 1953 121/56 98.4  F (36.9  C) Temporal 98 16 97 % 1.524 m (5') 47.6 kg (105 lb)       PHYSICAL EXAM    Constitutional: Well developed, Well nourished, uncomfortable and mildly ill appearing, but not toxic.  HENT: Normocephalic, Atraumatic, Bilateral external ears normal, Oropharynx normal, mucous membranes moist, Nose normal.   Neck: Normal range of motion, No tenderness, Supple, No stridor.  Eyes: PERRL, EOMI, Conjunctiva normal, No discharge.   Respiratory: Normal breath sounds, No respiratory distress, No wheezing, Speaks full sentences easily. No cough.  Cardiovascular: Normal heart rate, Regular rhythm, No murmurs, No rubs, No gallops. Chest wall nontender.  GI: Soft, No tenderness, No masses. No rebound or guarding.  Mild left CVA tenderness, no skin rash or lesions or flank ecchymosis   Musculoskeletal: 2+ DP pulses. No edema. No cyanosis, No clubbing. Good range of motion in all major joints. No tenderness to palpation or major deformities noted. No tenderness of the CTLS spine.   Integument: Warm, Dry, No erythema, No rash. No petechiae.  Neurologic: Alert & oriented x 3, Normal motor function, Normal sensory function, No focal deficits noted. Normal gait.  Psychiatric: Affect normal, Judgment normal, Mood normal. Cooperative.    LAB:  All pertinent labs reviewed and interpreted.  Labs Ordered and Resulted from Time of ED Arrival to Time of ED Departure   ROUTINE UA WITH MICROSCOPIC REFLEX TO CULTURE - Abnormal       Result Value    Color Urine Dark Yellow (*)     Appearance Urine Turbid (*)     Glucose Urine Negative      Bilirubin Urine Negative      Ketones Urine Negative      Specific Gravity Urine 1.007      Blood Urine 1.0 mg/dL (*)     pH Urine 5.5      Protein Albumin Urine 20 (*)     Urobilinogen Urine <2.0      Nitrite Urine Positive (*)     Leukocyte Esterase Urine 500 Stephen/uL (*)      Bacteria Urine Many (*)     WBC Clumps Urine Present (*)     RBC Urine 22 (*)     WBC Urine 127 (*)     Squamous Epithelials Urine <1     CRP INFLAMMATION - Abnormal    CRP Inflammation 78.80 (*)    CBC WITH PLATELETS AND DIFFERENTIAL - Abnormal    WBC Count 13.7 (*)     RBC Count 4.03      Hemoglobin 11.4 (*)     Hematocrit 34.8 (*)     MCV 86      MCH 28.3      MCHC 32.8      RDW 12.2      Platelet Count 180      % Neutrophils 87      % Lymphocytes 5      % Monocytes 7      % Eosinophils 0      % Basophils 0      % Immature Granulocytes 1      NRBCs per 100 WBC 0      Absolute Neutrophils 11.9 (*)     Absolute Lymphocytes 0.6 (*)     Absolute Monocytes 0.9      Absolute Eosinophils 0.0      Absolute Basophils 0.1      Absolute Immature Granulocytes 0.1      Absolute NRBCs 0.0     LACTIC ACID WHOLE BLOOD - Abnormal    Lactic Acid 0.5 (*)    COMPREHENSIVE METABOLIC PANEL - Normal    Sodium 136      Potassium 4.2      Carbon Dioxide (CO2) 26      Anion Gap 9      Urea Nitrogen 15.1      Creatinine 0.87      GFR Estimate 85      Calcium 9.2      Chloride 101      Glucose 90      Alkaline Phosphatase 59      AST 13      ALT <5      Protein Total 6.5      Albumin 3.8      Bilirubin Total 0.3     HCG QUALITATIVE URINE - Normal    hCG Urine Qualitative Negative     COMPREHENSIVE METABOLIC PANEL   MAGNESIUM   URINE CULTURE   BLOOD CULTURE   BLOOD CULTURE       RADIOLOGY:  Reviewed all pertinent imaging. Please see official radiology report.  CT Abdomen Pelvis w Contrast   Final Result   IMPRESSION:    1.  Obstructing calculus in the left mid ureter with mild left hydroureteronephrosis.   2.  Patchy bilateral renal hypoenhancement, left greater than right, compatible with pyelonephritis. Few more focal areas of hypodensity in the left kidney were not definitely seen on prior CT and could represent cysts or areas of lobar    nephronia/developing abscesses.          EKG:    None     PROCEDURES:   None     Diagnosis:  1.  Pyelonephritis of left kidney    2. Calculus of kidney    3. UTI (lower urinary tract infection)    4. Left flank pain    5. Left ureteral stone        I, Paris Calloway, am serving as a scribe to document services personally performed by Danielle Quick PA-C based on my observation and the provider's statements to me. I, Danielle Qiuck PA-C attest that Paris Calloway is acting in a scribe capacity, has observed my performance of the services and has documented them in accordance with my direction.    Danielle Quick PA-C  Emergency Medicine  [facility]  11/17/2023       Danielle Quick PA-C  11/18/23 0025

## 2023-11-18 NOTE — ANESTHESIA POSTPROCEDURE EVALUATION
Patient: Danielle Jarrell    Procedure: Procedure(s):  CYSTOSCOPY, WITH LEFT URETERAL STENT INSERTION       Anesthesia Type:  MAC    Note:  Disposition: Admission   Postop Pain Control: Uneventful            Sign Out: Well controlled pain   PONV: No   Neuro/Psych: Uneventful            Sign Out: Acceptable/Baseline neuro status   Airway/Respiratory: Uneventful            Sign Out: Acceptable/Baseline resp. status   CV/Hemodynamics: Uneventful            Sign Out: Acceptable CV status; No obvious hypovolemia; No obvious fluid overload   Other NRE: NONE   DID A NON-ROUTINE EVENT OCCUR? No           Last vitals:  Vitals Value Taken Time   /56 11/18/23 1010   Temp 37.2  C (98.9  F) 11/18/23 0910   Pulse 75 11/18/23 1013   Resp 15 11/18/23 1013   SpO2 97 % 11/18/23 1013   Vitals shown include unfiled device data.    Electronically Signed By: Asael Davis MD  November 18, 2023  10:38 AM

## 2023-11-18 NOTE — CONSULTS
"HPI:  Danielle Jarrell is a 43 year old female being seen for infected ureteral stone.    Prior history of same a couple of years ago.     3 months of \"UTI symptoms\"    2d discomfort left flank    1d subj fevers and N/V    Smoker -- 1 ppd    Travels a lot around country in . Not consistently in [place], although this is home. Planning a trip to  for next week. May impact scheduling of stone removal .    Exam:  BP 97/55   Pulse 106   Temp 98.4  F (36.9  C) (Temporal)   Resp 16   Ht 1.524 m (5')   Wt 47.6 kg (105 lb)   LMP 10/31/2023 (Approximate)   SpO2 98%   BMI 20.51 kg/m    General: age-appropriate appearing female in NAD sitting in mild distress. A+O x 3    Back: bony spine is non-tender, flanks tender on L.     Review of Imaging:  The following imaging exams were independently viewed and interpreted by me and discussed with patient:  CT Scan Abd/Pelvis: Abnormal: left mid ureteral stone with minimal/no hydro. SOme left renal cysts that have been seen on prior CT. Early pyelo changes.     Review of Labs:  The following labs were reviewed by me and discussed with the patient:  Recent Results (from the past 720 hour(s))   Comprehensive metabolic panel    Collection Time: 11/17/23  8:51 PM   Result Value Ref Range    Sodium 136 135 - 145 mmol/L    Potassium 4.2 3.4 - 5.3 mmol/L    Carbon Dioxide (CO2) 26 22 - 29 mmol/L    Anion Gap 9 7 - 15 mmol/L    Urea Nitrogen 15.1 6.0 - 20.0 mg/dL    Creatinine 0.87 0.51 - 0.95 mg/dL    GFR Estimate 85 >60 mL/min/1.73m2    Calcium 9.2 8.6 - 10.0 mg/dL    Chloride 101 98 - 107 mmol/L    Glucose 90 70 - 99 mg/dL    Alkaline Phosphatase 59 40 - 150 U/L    AST 13 0 - 45 U/L    ALT <5 0 - 50 U/L    Protein Total 6.5 6.4 - 8.3 g/dL    Albumin 3.8 3.5 - 5.2 g/dL    Bilirubin Total 0.3 <=1.2 mg/dL   UA with Microscopic reflex to Culture    Collection Time: 11/17/23  8:51 PM    Specimen: Urine, Clean Catch   Result Value Ref Range    Color Urine Dark Yellow (A) Colorless, Straw, " Light Yellow, Yellow    Appearance Urine Turbid (A) Clear    Glucose Urine Negative Negative mg/dL    Bilirubin Urine Negative Negative    Ketones Urine Negative Negative mg/dL    Specific Gravity Urine 1.007 1.001 - 1.030    Blood Urine 1.0 mg/dL (A) Negative    pH Urine 5.5 5.0 - 7.0    Protein Albumin Urine 20 (A) Negative mg/dL    Urobilinogen Urine <2.0 <2.0 mg/dL    Nitrite Urine Positive (A) Negative    Leukocyte Esterase Urine 500 Stephen/uL (A) Negative    Bacteria Urine Many (A) None Seen /HPF    WBC Clumps Urine Present (A) None Seen /HPF    RBC Urine 22 (H) <=2 /HPF    WBC Urine 127 (H) <=5 /HPF    Squamous Epithelials Urine <1 <=1 /HPF   CRP inflammation    Collection Time: 11/17/23  8:51 PM   Result Value Ref Range    CRP Inflammation 78.80 (H) <5.00 mg/L   HCG qualitative urine    Collection Time: 11/17/23  8:51 PM   Result Value Ref Range    hCG Urine Qualitative Negative Negative   CBC with platelets and differential    Collection Time: 11/17/23  8:51 PM   Result Value Ref Range    WBC Count 13.7 (H) 4.0 - 11.0 10e3/uL    RBC Count 4.03 3.80 - 5.20 10e6/uL    Hemoglobin 11.4 (L) 11.7 - 15.7 g/dL    Hematocrit 34.8 (L) 35.0 - 47.0 %    MCV 86 78 - 100 fL    MCH 28.3 26.5 - 33.0 pg    MCHC 32.8 31.5 - 36.5 g/dL    RDW 12.2 10.0 - 15.0 %    Platelet Count 180 150 - 450 10e3/uL    % Neutrophils 87 %    % Lymphocytes 5 %    % Monocytes 7 %    % Eosinophils 0 %    % Basophils 0 %    % Immature Granulocytes 1 %    NRBCs per 100 WBC 0 <1 /100    Absolute Neutrophils 11.9 (H) 1.6 - 8.3 10e3/uL    Absolute Lymphocytes 0.6 (L) 0.8 - 5.3 10e3/uL    Absolute Monocytes 0.9 0.0 - 1.3 10e3/uL    Absolute Eosinophils 0.0 0.0 - 0.7 10e3/uL    Absolute Basophils 0.1 0.0 - 0.2 10e3/uL    Absolute Immature Granulocytes 0.1 <=0.4 10e3/uL    Absolute NRBCs 0.0 10e3/uL   Magnesium Lab    Collection Time: 11/17/23  8:51 PM   Result Value Ref Range    Magnesium 1.8 1.7 - 2.3 mg/dL   Lactic acid whole blood    Collection Time:  11/17/23 10:37 PM   Result Value Ref Range    Lactic Acid 0.5 (L) 0.7 - 2.0 mmol/L   Comprehensive metabolic panel    Collection Time: 11/18/23  5:31 AM   Result Value Ref Range    Sodium 138 135 - 145 mmol/L    Potassium 3.4 3.4 - 5.3 mmol/L    Carbon Dioxide (CO2) 23 22 - 29 mmol/L    Anion Gap 8 7 - 15 mmol/L    Urea Nitrogen 13.1 6.0 - 20.0 mg/dL    Creatinine 0.80 0.51 - 0.95 mg/dL    GFR Estimate >90 >60 mL/min/1.73m2    Calcium 8.0 (L) 8.6 - 10.0 mg/dL    Chloride 107 98 - 107 mmol/L    Glucose 98 70 - 99 mg/dL    Alkaline Phosphatase 62 40 - 150 U/L    AST 23 0 - 45 U/L    ALT 15 0 - 50 U/L    Protein Total 5.3 (L) 6.4 - 8.3 g/dL    Albumin 3.1 (L) 3.5 - 5.2 g/dL    Bilirubin Total 0.3 <=1.2 mg/dL   CBC with platelets and differential    Collection Time: 11/18/23  5:31 AM   Result Value Ref Range    WBC Count 19.2 (H) 4.0 - 11.0 10e3/uL    RBC Count 3.69 (L) 3.80 - 5.20 10e6/uL    Hemoglobin 10.4 (L) 11.7 - 15.7 g/dL    Hematocrit 31.7 (L) 35.0 - 47.0 %    MCV 86 78 - 100 fL    MCH 28.2 26.5 - 33.0 pg    MCHC 32.8 31.5 - 36.5 g/dL    RDW 12.3 10.0 - 15.0 %    Platelet Count 156 150 - 450 10e3/uL    % Neutrophils 91 %    % Lymphocytes 3 %    % Monocytes 5 %    % Eosinophils 0 %    % Basophils 0 %    % Immature Granulocytes 1 %    NRBCs per 100 WBC 0 <1 /100    Absolute Neutrophils 17.5 (H) 1.6 - 8.3 10e3/uL    Absolute Lymphocytes 0.5 (L) 0.8 - 5.3 10e3/uL    Absolute Monocytes 1.0 0.0 - 1.3 10e3/uL    Absolute Eosinophils 0.0 0.0 - 0.7 10e3/uL    Absolute Basophils 0.0 0.0 - 0.2 10e3/uL    Absolute Immature Granulocytes 0.2 <=0.4 10e3/uL    Absolute NRBCs 0.0 10e3/uL         Assessment & Plan   Infected ureteral stone -- on CTX, left ureteral stent. Risks discussed.  Will keep in house on IV ABx for a couple of days until cultures result  Will arrange for outpatient stone removal in >2 weeks.     ==========================      Additional Coding Information:    Problems:  5 -- one acute or chronic illness  with poses a threat to life or bodily function    Data Reviewed  Review of the result(s) of each unique test - UA, CBC    Independent interpretation of a test performed by another physician/other qualified health care professional (not separately reported) - CT  Level of risk:  4 -- minor surgery with patient or procedure risks

## 2023-11-18 NOTE — OR NURSING
Dr. Holland wanted patient to stay overnight. House lead found patient a bed in PT gym before going into OR. After procedure, report was given to RN in PT gym. Upon arrival in PT gym, RN and Charge were uncomfortable with patient being there with systolic BP in 80's. Patient alert and oriented and pleasant, on room air, pain level of 5. Unit declined patient transfer and OR was told to bring patient back to PACU. Patient is now resting comfortably in PACU awaiting PACU staff.

## 2023-11-18 NOTE — ED PROVIDER NOTES
Emergency Department Midlevel Supervisory Note     I personally saw the patient and performed a substantive portion of the visit including all aspects of the medical decision making.    ED Course:  10:36 PM Danielle Quick PA-C staffed patient with me. I agree with their assessment and plan of management, and I will see the patient.  10:40 PM I met with the patient to introduce myself, gather additional history, perform my initial exam, and discuss the plan.     Brief HPI:     Danielle Jarrell is a 43 year old female who presents for evaluation of flank pain and UTI. She associates burning dysuria, urinary frequency, subjective fevers, and chills within the past couple of days. She had diarrhea and vomiting yesterday. She has taken tylenol, ibuprofen, aspirin, naproxen, and uristat around an hour ago. She has a history of kidney stones, but her symptoms tonight does not feel similar. She was diagnosed with a UTI around a month ago but she took the wrong medication. She had a cholecystectomy done. No other complaints or concerns at this time.    She is complaining of ongoing left flank pain for the past 2 days ago and becomes sharp with exertion.     I, Jayden Rosario, am serving as a scribe to document services personally performed by Regino Michelle DO, based on my observations and the provider's statements to me.   I, Regino Michelle DO attest that Jayden Rosario was acting in a scribe capacity, has observed my performance of the services and has documented them in accordance with my direction.    Brief Physical Exam: BP 95/57   Pulse 93   Temp 98.4  F (36.9  C) (Temporal)   Resp 16   Ht 1.524 m (5')   Wt 47.6 kg (105 lb)   LMP 10/31/2023 (Approximate)   SpO2 100%   BMI 20.51 kg/m    Constitutional:  Alert, in no acute distress  EYES: Conjunctivae clear  HENT:  Atraumatic, normocephalic  Respiratory:  Respirations even, unlabored, in no acute respiratory distress  Cardiovascular:  Regular rate and rhythm, good  peripheral perfusion  GI: Soft, TTP left flank and LLQ, nondistended, no palpable masses, no rebound, no guarding   Musculoskeletal:  No edema. No cyanosis. Range of motion major extremities intact.    Integument: Warm, Dry, No erythema, No rash.   Neurologic:  Alert & oriented, no focal deficits noted  Psych: Normal mood and affect     MDM:  Pt seen in conjunction with SUMANTH Kasey Quick.  Pt here with obstructing left ureteral stone with pyleonephritis.  Recent UTIs, not fully treated at home and having fevers recent, increasing pain.  Long history of ureteral stones in the past.  Pt receiving IV antibx, KSI consulted and will stent in the morning.  Pt hemodynamically stable, hospitalized for IV antibx, urology intervention.         1. Pyelonephritis of left kidney    2. Calculus of kidney    3. UTI (lower urinary tract infection)    4. Left flank pain    5. Left ureteral stone        Labs and Imaging:  Results for orders placed or performed during the hospital encounter of 11/17/23   CT Abdomen Pelvis w Contrast    Impression    IMPRESSION:   1.  Obstructing calculus in the left mid ureter with mild left hydroureteronephrosis.  2.  Patchy bilateral renal hypoenhancement, left greater than right, compatible with pyelonephritis. Few more focal areas of hypodensity in the left kidney were not definitely seen on prior CT and could represent cysts or areas of lobar   nephronia/developing abscesses.   Comprehensive metabolic panel   Result Value Ref Range    Sodium 136 135 - 145 mmol/L    Potassium 4.2 3.4 - 5.3 mmol/L    Carbon Dioxide (CO2) 26 22 - 29 mmol/L    Anion Gap 9 7 - 15 mmol/L    Urea Nitrogen 15.1 6.0 - 20.0 mg/dL    Creatinine 0.87 0.51 - 0.95 mg/dL    GFR Estimate 85 >60 mL/min/1.73m2    Calcium 9.2 8.6 - 10.0 mg/dL    Chloride 101 98 - 107 mmol/L    Glucose 90 70 - 99 mg/dL    Alkaline Phosphatase 59 40 - 150 U/L    AST 13 0 - 45 U/L    ALT <5 0 - 50 U/L    Protein Total 6.5 6.4 - 8.3 g/dL    Albumin 3.8  3.5 - 5.2 g/dL    Bilirubin Total 0.3 <=1.2 mg/dL   UA with Microscopic reflex to Culture    Specimen: Urine, Clean Catch   Result Value Ref Range    Color Urine Dark Yellow (A) Colorless, Straw, Light Yellow, Yellow    Appearance Urine Turbid (A) Clear    Glucose Urine Negative Negative mg/dL    Bilirubin Urine Negative Negative    Ketones Urine Negative Negative mg/dL    Specific Gravity Urine 1.007 1.001 - 1.030    Blood Urine 1.0 mg/dL (A) Negative    pH Urine 5.5 5.0 - 7.0    Protein Albumin Urine 20 (A) Negative mg/dL    Urobilinogen Urine <2.0 <2.0 mg/dL    Nitrite Urine Positive (A) Negative    Leukocyte Esterase Urine 500 Stephen/uL (A) Negative    Bacteria Urine Many (A) None Seen /HPF    WBC Clumps Urine Present (A) None Seen /HPF    RBC Urine 22 (H) <=2 /HPF    WBC Urine 127 (H) <=5 /HPF    Squamous Epithelials Urine <1 <=1 /HPF   Result Value Ref Range    CRP Inflammation 78.80 (H) <5.00 mg/L   HCG qualitative urine   Result Value Ref Range    hCG Urine Qualitative Negative Negative   CBC with platelets and differential   Result Value Ref Range    WBC Count 13.7 (H) 4.0 - 11.0 10e3/uL    RBC Count 4.03 3.80 - 5.20 10e6/uL    Hemoglobin 11.4 (L) 11.7 - 15.7 g/dL    Hematocrit 34.8 (L) 35.0 - 47.0 %    MCV 86 78 - 100 fL    MCH 28.3 26.5 - 33.0 pg    MCHC 32.8 31.5 - 36.5 g/dL    RDW 12.2 10.0 - 15.0 %    Platelet Count 180 150 - 450 10e3/uL    % Neutrophils 87 %    % Lymphocytes 5 %    % Monocytes 7 %    % Eosinophils 0 %    % Basophils 0 %    % Immature Granulocytes 1 %    NRBCs per 100 WBC 0 <1 /100    Absolute Neutrophils 11.9 (H) 1.6 - 8.3 10e3/uL    Absolute Lymphocytes 0.6 (L) 0.8 - 5.3 10e3/uL    Absolute Monocytes 0.9 0.0 - 1.3 10e3/uL    Absolute Eosinophils 0.0 0.0 - 0.7 10e3/uL    Absolute Basophils 0.1 0.0 - 0.2 10e3/uL    Absolute Immature Granulocytes 0.1 <=0.4 10e3/uL    Absolute NRBCs 0.0 10e3/uL   Lactic acid whole blood   Result Value Ref Range    Lactic Acid 0.5 (L) 0.7 - 2.0 mmol/L     I  have reviewed the relevant laboratory and radiology studies    Procedures:  I was present for the key portions of this procedure: none    Regino Michelle DO  [facility] EMERGENCY ROOM  [facility] [facility] [id number] [phone]       Regino Michelle MD  11/17/23 [id number]

## 2023-11-18 NOTE — PLAN OF CARE
Problem: Adult Inpatient Plan of Care  Goal: Optimal Comfort and Wellbeing  Outcome: Progressing   Goal Outcome Evaluation:      Pt is calm and cooperative. Complaining of L flank pain 8 out of 10, and burning pain w/urination, 9 to 10 out of 10. Multiple pain meds given and heat pack administered with minimal to no response. Ketorolac loading dose 30 mg IV given with significant improvement to pain.     Urine has been red with clots. No BM.     BP was 87/48, gave bolus of NS, BP after bolus was 100/55.

## 2023-11-19 LAB
ANION GAP SERPL CALCULATED.3IONS-SCNC: 7 MMOL/L (ref 7–15)
BACTERIA UR CULT: ABNORMAL
BACTERIA UR CULT: ABNORMAL
BUN SERPL-MCNC: 14.5 MG/DL (ref 6–20)
CALCIUM SERPL-MCNC: 8.8 MG/DL (ref 8.6–10)
CHLORIDE SERPL-SCNC: 110 MMOL/L (ref 98–107)
CREAT SERPL-MCNC: 0.72 MG/DL (ref 0.51–0.95)
CREAT SERPL-MCNC: 0.72 MG/DL (ref 0.51–0.95)
DEPRECATED HCO3 PLAS-SCNC: 22 MMOL/L (ref 22–29)
EGFRCR SERPLBLD CKD-EPI 2021: >90 ML/MIN/1.73M2
EGFRCR SERPLBLD CKD-EPI 2021: >90 ML/MIN/1.73M2
ERYTHROCYTE [DISTWIDTH] IN BLOOD BY AUTOMATED COUNT: 12.7 % (ref 10–15)
GLUCOSE SERPL-MCNC: 120 MG/DL (ref 70–99)
HCT VFR BLD AUTO: 30.9 % (ref 35–47)
HGB BLD-MCNC: 9.5 G/DL (ref 11.7–15.7)
MAGNESIUM SERPL-MCNC: 2.3 MG/DL (ref 1.7–2.3)
MCH RBC QN AUTO: 27.9 PG (ref 26.5–33)
MCHC RBC AUTO-ENTMCNC: 30.7 G/DL (ref 31.5–36.5)
MCV RBC AUTO: 91 FL (ref 78–100)
PLATELET # BLD AUTO: 187 10E3/UL (ref 150–450)
POTASSIUM SERPL-SCNC: 4.8 MMOL/L (ref 3.4–5.3)
POTASSIUM SERPL-SCNC: 4.8 MMOL/L (ref 3.4–5.3)
RBC # BLD AUTO: 3.4 10E6/UL (ref 3.8–5.2)
SODIUM SERPL-SCNC: 139 MMOL/L (ref 135–145)
WBC # BLD AUTO: 24.6 10E3/UL (ref 4–11)

## 2023-11-19 PROCEDURE — 250N000013 HC RX MED GY IP 250 OP 250 PS 637: Performed by: INTERNAL MEDICINE

## 2023-11-19 PROCEDURE — 85027 COMPLETE CBC AUTOMATED: CPT | Performed by: INTERNAL MEDICINE

## 2023-11-19 PROCEDURE — 250N000013 HC RX MED GY IP 250 OP 250 PS 637: Performed by: UROLOGY

## 2023-11-19 PROCEDURE — 84132 ASSAY OF SERUM POTASSIUM: CPT | Performed by: INTERNAL MEDICINE

## 2023-11-19 PROCEDURE — 36415 COLL VENOUS BLD VENIPUNCTURE: CPT | Performed by: INTERNAL MEDICINE

## 2023-11-19 PROCEDURE — 82565 ASSAY OF CREATININE: CPT | Performed by: INTERNAL MEDICINE

## 2023-11-19 PROCEDURE — 80048 BASIC METABOLIC PNL TOTAL CA: CPT | Performed by: INTERNAL MEDICINE

## 2023-11-19 PROCEDURE — 258N000003 HC RX IP 258 OP 636: Performed by: INTERNAL MEDICINE

## 2023-11-19 PROCEDURE — 83735 ASSAY OF MAGNESIUM: CPT | Performed by: INTERNAL MEDICINE

## 2023-11-19 PROCEDURE — 99232 SBSQ HOSP IP/OBS MODERATE 35: CPT | Performed by: INTERNAL MEDICINE

## 2023-11-19 PROCEDURE — 120N000001 HC R&B MED SURG/OB

## 2023-11-19 PROCEDURE — 250N000011 HC RX IP 250 OP 636: Mod: JZ | Performed by: INTERNAL MEDICINE

## 2023-11-19 RX ORDER — SODIUM CHLORIDE 9 MG/ML
INJECTION, SOLUTION INTRAVENOUS CONTINUOUS
Status: DISCONTINUED | OUTPATIENT
Start: 2023-11-19 | End: 2023-11-19

## 2023-11-19 RX ORDER — HYDROMORPHONE HCL IN WATER/PF 6 MG/30 ML
0.2 PATIENT CONTROLLED ANALGESIA SYRINGE INTRAVENOUS EVERY 8 HOURS PRN
Status: DISCONTINUED | OUTPATIENT
Start: 2023-11-19 | End: 2023-11-20 | Stop reason: HOSPADM

## 2023-11-19 RX ORDER — HYDROXYZINE HYDROCHLORIDE 25 MG/1
25 TABLET, FILM COATED ORAL 3 TIMES DAILY PRN
Status: DISCONTINUED | OUTPATIENT
Start: 2023-11-19 | End: 2023-11-20 | Stop reason: HOSPADM

## 2023-11-19 RX ORDER — OXYCODONE HYDROCHLORIDE 5 MG/1
5-10 TABLET ORAL EVERY 4 HOURS PRN
Status: DISCONTINUED | OUTPATIENT
Start: 2023-11-19 | End: 2023-11-20 | Stop reason: HOSPADM

## 2023-11-19 RX ADMIN — HYDROXYZINE HYDROCHLORIDE 25 MG: 25 TABLET, FILM COATED ORAL at 09:26

## 2023-11-19 RX ADMIN — OXYCODONE HYDROCHLORIDE 5 MG: 5 TABLET ORAL at 13:05

## 2023-11-19 RX ADMIN — SODIUM CHLORIDE, PRESERVATIVE FREE: 5 INJECTION INTRAVENOUS at 09:28

## 2023-11-19 RX ADMIN — OXYCODONE HYDROCHLORIDE 10 MG: 5 TABLET ORAL at 20:56

## 2023-11-19 RX ADMIN — KETOROLAC TROMETHAMINE 15 MG: 15 INJECTION, SOLUTION INTRAMUSCULAR; INTRAVENOUS at 11:54

## 2023-11-19 RX ADMIN — NICOTINE POLACRILEX 2 MG: 2 GUM, CHEWING BUCCAL at 01:02

## 2023-11-19 RX ADMIN — OXYCODONE HYDROCHLORIDE 5 MG: 5 TABLET ORAL at 08:23

## 2023-11-19 RX ADMIN — OXYCODONE HYDROCHLORIDE 5 MG: 5 TABLET ORAL at 04:04

## 2023-11-19 RX ADMIN — NICOTINE POLACRILEX 2 MG: 2 GUM, CHEWING BUCCAL at 03:14

## 2023-11-19 RX ADMIN — NICOTINE POLACRILEX 4 MG: 4 GUM, CHEWING BUCCAL at 08:49

## 2023-11-19 RX ADMIN — NICOTINE POLACRILEX 4 MG: 4 GUM, CHEWING BUCCAL at 13:52

## 2023-11-19 RX ADMIN — METHOCARBAMOL 500 MG: 500 TABLET ORAL at 16:43

## 2023-11-19 RX ADMIN — ACETAMINOPHEN 650 MG: 325 TABLET ORAL at 12:26

## 2023-11-19 RX ADMIN — HYDROXYZINE HYDROCHLORIDE 25 MG: 25 TABLET, FILM COATED ORAL at 20:57

## 2023-11-19 RX ADMIN — KETOROLAC TROMETHAMINE 15 MG: 15 INJECTION, SOLUTION INTRAMUSCULAR; INTRAVENOUS at 18:15

## 2023-11-19 RX ADMIN — OXYCODONE HYDROCHLORIDE 5 MG: 5 TABLET ORAL at 12:26

## 2023-11-19 RX ADMIN — OXYCODONE HYDROCHLORIDE 10 MG: 5 TABLET ORAL at 16:43

## 2023-11-19 RX ADMIN — CEFTRIAXONE 1 G: 1 INJECTION, POWDER, FOR SOLUTION INTRAMUSCULAR; INTRAVENOUS at 20:49

## 2023-11-19 RX ADMIN — VANCOMYCIN HYDROCHLORIDE 750 MG: 5 INJECTION, POWDER, LYOPHILIZED, FOR SOLUTION INTRAVENOUS at 01:03

## 2023-11-19 RX ADMIN — ACETAMINOPHEN 650 MG: 325 TABLET ORAL at 20:55

## 2023-11-19 RX ADMIN — KETOROLAC TROMETHAMINE 15 MG: 15 INJECTION, SOLUTION INTRAMUSCULAR; INTRAVENOUS at 06:25

## 2023-11-19 RX ADMIN — ACETAMINOPHEN 650 MG: 325 TABLET ORAL at 16:43

## 2023-11-19 RX ADMIN — METHOCARBAMOL 500 MG: 500 TABLET ORAL at 08:23

## 2023-11-19 RX ADMIN — ACETAMINOPHEN 650 MG: 325 TABLET ORAL at 04:04

## 2023-11-19 RX ADMIN — SODIUM CHLORIDE, PRESERVATIVE FREE: 5 INJECTION INTRAVENOUS at 04:04

## 2023-11-19 RX ADMIN — NICOTINE POLACRILEX 4 MG: 4 GUM, CHEWING BUCCAL at 19:49

## 2023-11-19 RX ADMIN — Medication 5 MG: at 20:58

## 2023-11-19 ASSESSMENT — ACTIVITIES OF DAILY LIVING (ADL)
ADLS_ACUITY_SCORE: 18

## 2023-11-19 NOTE — PROGRESS NOTES
[facility] ACUTE PAIN SERVICE  ([facility], [facility], [facility])     Date of Admission:  11/17/2023  Date of Consult : 11/19/23  Physician requesting consult: Dr. Waite   Reason for consult: dysuria, ureteral stent pain     Assessment/Plan:     Danielle Jarrell is a 43 year old female who was admitted on 11/17/2023.  1 Day Post-Op cystoscopy, with left ureteral stent insertion. Acute pain Team was asked to see the patient for ureteral stent pain. Admitted for pyelonephritis of left kidney, calculus of kidney, left flank pain, left ureteral stone.  UTI, dysuria, fevers, chills. . History including kidney stones . Pain began a couple of days prior to admission. Urology consulted, infected ureteral stone, stent placed 11/18/23.   Describes pain as 8/10 , burning and pain with urination and after. Patient is worried that smell of urine this am is worse. The patient denies constipation..The patient does smoke and denies chemical dependency history.     Discussed plan with Dr. Waite, he had already dcd Norco and increased oxycodone, agree with this change.    PLAN:   1) Left flank pain, post-op stent placement,   CT and ultrasound consistent with infected left ureteral cyst, pyelonephritis, and possible developing abscess in the left kidney.  The patients home MME is zero.   2)Multimodal Medication Therapy  Topical: none=do not think it would be helpful  Muscle relaxant: methocarbamol 500 mg tid prn muscle spasms.  NSAIDs-Toradol 15 mg IV q 6 h prn inflammatory pain  Adjuvants: Tylenol is prn, calcium carbonate 1000 mg qid prn heart burn  Antidepressants/anxiolytics: Atarax 25 mg tid prn itching, anxiety  Opioids: Norco dc'd, oxycodone increase to 5-10 mg 5/325 1 tab q 4 h prn moderate pain  IV Pain medication: Decrease IV Dilaudid IV 0.2 mg q 8 h prn severe pain  3)Non-medication interventions- Ice, Heat, physical therapy,  4)Constipation Prophylaxis- senna/docusate 1-2 bid prn  5) Follow  up   -Opioid prescriber has been none. PCP is No Ref-Primary, Physician  -Discharge Recommendations - We recommend prescribing the following at the time of discharge: Will determine     [place]  pulled 11/19/23, no opioids on report.        Medical History   has no past medical history on file.       Surgical History   has a past surgical history that includes Cholecystectomy; Cholecystectomy; Kidney Stone Surgery; Colon surgery; and Tonsillectomy.     Allergies   No Known Allergies     Current Home Medications   Prior to Admission medications    Medication Sig Start Date End Date Taking? Authorizing Provider   acetaminophen (TYLENOL) 500 MG tablet Take 500-1,000 mg by mouth every 6 hours as needed for mild pain   Yes Unknown, Entered By History   acyclovir (ZOVIRAX) 400 MG tablet [ACYCLOVIR (ZOVIRAX) 400 MG TABLET] Take 400 mg by mouth 2 (two) times a day as needed. 10/28/14  Yes Provider, Historical   albuterol (PROAIR HFA/PROVENTIL HFA/VENTOLIN HFA) 108 (90 Base) MCG/ACT inhaler Inhale 2 puffs into the lungs every 6 hours as needed for shortness of breath, wheezing or cough   Yes Unknown, Entered By History   varenicline (CHANTIX ALYSSA) 0.5 MG X 11 & 1 MG X 42 tablet Take 1 mg by mouth 2 times daily Take 0.5 mg tab daily for 3 days, THEN 0.5 mg tab twice daily for 4 days, THEN 1 mg twice daily.    Unknown, Entered By History          Social History  Reviewed, and she  reports that she quit smoking about 14 years ago. She uses smokeless tobacco. She reports current alcohol use. She reports that she does not use drugs.    Chelo Hooker, [facility]  Acute Care Pain Management Program   [facility] ([place], [place], [place], [place], [place])   Vocera text web console

## 2023-11-19 NOTE — PROGRESS NOTES
Pain improving. Urination becoming less painful, redness and clots decreasing. IVNS running at 150mL/hr. Continues on IV rocephin and vanco. PRN Toradol, dilaudid, oxycodone, robaxin utilized. Sea bands applied to aide in prevention of narcotic induced nausea. SCD pumps utilized. Nicotine gum ordered and utilized. Did ambulate in hallway overnight. Education regarding antibiotics and pain medications provided. UC resulted, susceptible to current antibiotics. Calls appropriately. On K/Mg protocols, to be rechecked this morning.

## 2023-11-19 NOTE — PROGRESS NOTES
[facility]    Medicine Progress Note - Hospitalist Service    Date of Admission:  11/17/2023    Assessment & Plan   43-year-old female with history of ovarian cysts, presenting on 11/17/2023 with left flank pain.  CT and ultrasound consistent with infected left ureteral cyst, pyelonephritis, and possible developing abscess in the left kidney.  Patient underwent cystoscopy with ureteral stent insertion on 11/18/2023.  Treated for severe sepsis secondary to UTI.     Infected left ureteral stone  Pyelonephritis  Mild left hydroureteronephrosis  Severe sepsis secondary to UTI  Status post left ureteral stent placement.  Urine culture growing E.coli. pansusceptible.   11/19/2023 WBC increased to 24.6 from 19.2.  Continue ceftriaxone 2 g IV every 24 hours  Discontinue Vancomycin.   Question if patient will need reimaging given pain and elevation in WBC.  Continue APAP, Ketorolac, hydroxyzine, oxycodone.  Pain service consultation    Tobacco dependence  Plan to start Chantix at discharge.     Anxiety  OCD    History of ovarian cyst          Diet: Regular Diet Adult    DVT Prophylaxis: Pneumatic Compression Devices  Painter Catheter: Not present  Lines: None     Cardiac Monitoring: None  Code Status: Full Code      Clinically Significant Risk Factors              # Hypoalbuminemia: Lowest albumin = 3.1 g/dL at 11/18/2023  5:31 AM, will monitor as appropriate                       Disposition Plan      Expected Discharge Date: 11/20/2023                    Marcel Waite DO  Hospitalist Service  [facility]  Securely message with [employer] (more info)  Text page via [employer] Paging/Directory   ______________________________________________________________________    Interval History   Danielle is laying in bed, she complains of lower and left lateral abdominal and left flank pain with movement.  Pain medications were relieving pain overnight.  She reports sharp and aching pain.   She denies fever or chills, no nausea or vomiting.    Physical Exam   Vital Signs: Temp: 97.6  F (36.4  C) Temp src: Oral BP: 105/55 Pulse: 79   Resp: 16 SpO2: 98 % O2 Device: None (Room air)    Weight: 105 lbs 0 oz    General Appearance:  Alert, cooperative, no distress  Head:    Normocephalic, without obvious abnormality, atraumatic  EENT:  PERRL, conjunctiva/corneas clear, EOM's intact.   Neck:    Supple, symmetrical, trachea midline, no adenopathy; no NVE  Back:  Symmetric, no curvature, no CVA tenderness  Chest/Lungs: Clear to auscultation bilaterally, respirations unlabored, No tenderness or deformity. No abdominal breathing or use of accessory muscles.   Heart:    Regular rate and rhythm, S1 and S2 normal, no murmur, rub   or gallop  Abdomen: Soft, non-tender, bowel sounds active all four quadrants, not peritoneal on palpation. Not distended - left flank pain   Extremities:  Extremities normal, atraumatic, no swelling   Skin:  Skin color, texture, turgor normal, no rashes or lesion  Neurologic:  Awake and alert, No lateralizing or localizing signs     Medical Decision Making       40 MINUTES SPENT BY ME on the date of service doing chart review, history, exam, documentation & further activities per the note.      Data     I have personally reviewed the following data over the past 24 hrs:    N/A  \   N/A   / N/A     N/A N/A N/A /  N/A   4.8 N/A 0.72 \     Procal: 2.09 (H) CRP: N/A Lactic Acid: 0.8         Imaging results reviewed over the past 24 hrs:   No results found for this or any previous visit (from the past 24 hour(s)).

## 2023-11-20 ENCOUNTER — TELEPHONE (OUTPATIENT)
Dept: UROLOGY | Facility: CLINIC | Age: 43
End: 2023-11-20

## 2023-11-20 VITALS
SYSTOLIC BLOOD PRESSURE: 136 MMHG | BODY MASS INDEX: 23.46 KG/M2 | HEIGHT: 60 IN | OXYGEN SATURATION: 96 % | RESPIRATION RATE: 16 BRPM | WEIGHT: 119.5 LBS | DIASTOLIC BLOOD PRESSURE: 86 MMHG | TEMPERATURE: 97.9 F | HEART RATE: 85 BPM

## 2023-11-20 DIAGNOSIS — N20.0 NEPHROLITHIASIS: Primary | ICD-10-CM

## 2023-11-20 LAB
ANION GAP SERPL CALCULATED.3IONS-SCNC: 6 MMOL/L (ref 7–15)
BUN SERPL-MCNC: 15.5 MG/DL (ref 6–20)
CALCIUM SERPL-MCNC: 8.2 MG/DL (ref 8.6–10)
CHLORIDE SERPL-SCNC: 112 MMOL/L (ref 98–107)
CREAT SERPL-MCNC: 0.85 MG/DL (ref 0.51–0.95)
DEPRECATED HCO3 PLAS-SCNC: 22 MMOL/L (ref 22–29)
EGFRCR SERPLBLD CKD-EPI 2021: 87 ML/MIN/1.73M2
ERYTHROCYTE [DISTWIDTH] IN BLOOD BY AUTOMATED COUNT: 12.6 % (ref 10–15)
GLUCOSE SERPL-MCNC: 92 MG/DL (ref 70–99)
HCT VFR BLD AUTO: 29.1 % (ref 35–47)
HGB BLD-MCNC: 9.2 G/DL (ref 11.7–15.7)
MAGNESIUM SERPL-MCNC: 1.9 MG/DL (ref 1.7–2.3)
MCH RBC QN AUTO: 27.9 PG (ref 26.5–33)
MCHC RBC AUTO-ENTMCNC: 31.6 G/DL (ref 31.5–36.5)
MCV RBC AUTO: 88 FL (ref 78–100)
PLATELET # BLD AUTO: 167 10E3/UL (ref 150–450)
POTASSIUM SERPL-SCNC: 4.5 MMOL/L (ref 3.4–5.3)
RBC # BLD AUTO: 3.3 10E6/UL (ref 3.8–5.2)
SODIUM SERPL-SCNC: 140 MMOL/L (ref 135–145)
WBC # BLD AUTO: 7.6 10E3/UL (ref 4–11)

## 2023-11-20 PROCEDURE — 99239 HOSP IP/OBS DSCHRG MGMT >30: CPT | Performed by: INTERNAL MEDICINE

## 2023-11-20 PROCEDURE — 250N000011 HC RX IP 250 OP 636: Performed by: INTERNAL MEDICINE

## 2023-11-20 PROCEDURE — 85027 COMPLETE CBC AUTOMATED: CPT | Performed by: INTERNAL MEDICINE

## 2023-11-20 PROCEDURE — 250N000013 HC RX MED GY IP 250 OP 250 PS 637: Performed by: INTERNAL MEDICINE

## 2023-11-20 PROCEDURE — 99254 IP/OBS CNSLTJ NEW/EST MOD 60: CPT | Performed by: NURSE PRACTITIONER

## 2023-11-20 PROCEDURE — 83735 ASSAY OF MAGNESIUM: CPT | Performed by: INTERNAL MEDICINE

## 2023-11-20 PROCEDURE — 36415 COLL VENOUS BLD VENIPUNCTURE: CPT | Performed by: INTERNAL MEDICINE

## 2023-11-20 PROCEDURE — 80048 BASIC METABOLIC PNL TOTAL CA: CPT | Performed by: INTERNAL MEDICINE

## 2023-11-20 PROCEDURE — 250N000013 HC RX MED GY IP 250 OP 250 PS 637: Performed by: HOSPITALIST

## 2023-11-20 RX ORDER — AMOXICILLIN 250 MG
1 CAPSULE ORAL 2 TIMES DAILY PRN
Qty: 14 TABLET | Refills: 0 | Status: SHIPPED | OUTPATIENT
Start: 2023-11-20

## 2023-11-20 RX ORDER — OXYCODONE HYDROCHLORIDE 5 MG/1
5-10 TABLET ORAL EVERY 6 HOURS PRN
Qty: 12 TABLET | Refills: 0 | Status: SHIPPED | OUTPATIENT
Start: 2023-11-20 | End: 2023-11-23

## 2023-11-20 RX ORDER — METHOCARBAMOL 500 MG/1
500 TABLET, FILM COATED ORAL 3 TIMES DAILY PRN
Qty: 21 TABLET | Refills: 0 | Status: SHIPPED | OUTPATIENT
Start: 2023-11-20

## 2023-11-20 RX ORDER — POLYETHYLENE GLYCOL 3350 17 G/17G
17 POWDER, FOR SOLUTION ORAL DAILY
Qty: 119 G | Refills: 0 | Status: SHIPPED | OUTPATIENT
Start: 2023-11-20 | End: 2023-11-27

## 2023-11-20 RX ORDER — MAGNESIUM OXIDE 400 MG/1
400 TABLET ORAL EVERY 4 HOURS
Status: DISCONTINUED | OUTPATIENT
Start: 2023-11-20 | End: 2023-11-20 | Stop reason: HOSPADM

## 2023-11-20 RX ORDER — HYDROXYZINE HYDROCHLORIDE 25 MG/1
25 TABLET, FILM COATED ORAL 3 TIMES DAILY PRN
Qty: 21 TABLET | Refills: 0 | Status: SHIPPED | OUTPATIENT
Start: 2023-11-20 | End: 2023-11-29

## 2023-11-20 RX ORDER — ACETAMINOPHEN 325 MG/1
650 TABLET ORAL EVERY 4 HOURS PRN
COMMUNITY
Start: 2023-11-20

## 2023-11-20 RX ORDER — CEFDINIR 300 MG/1
300 CAPSULE ORAL 2 TIMES DAILY
Qty: 28 CAPSULE | Refills: 0 | Status: SHIPPED | OUTPATIENT
Start: 2023-11-20 | End: 2023-11-29

## 2023-11-20 RX ORDER — IBUPROFEN 600 MG/1
600 TABLET, FILM COATED ORAL EVERY 6 HOURS PRN
Qty: 28 TABLET | Refills: 0 | Status: SHIPPED | OUTPATIENT
Start: 2023-11-20 | End: 2023-11-27

## 2023-11-20 RX ORDER — TAMSULOSIN HYDROCHLORIDE 0.4 MG/1
0.4 CAPSULE ORAL DAILY
Qty: 14 CAPSULE | Refills: 0 | Status: SHIPPED | OUTPATIENT
Start: 2023-11-20 | End: 2023-11-29

## 2023-11-20 RX ADMIN — NICOTINE POLACRILEX 4 MG: 4 GUM, CHEWING BUCCAL at 06:26

## 2023-11-20 RX ADMIN — OXYCODONE HYDROCHLORIDE 10 MG: 5 TABLET ORAL at 11:29

## 2023-11-20 RX ADMIN — OXYCODONE HYDROCHLORIDE 10 MG: 5 TABLET ORAL at 01:06

## 2023-11-20 RX ADMIN — ACETAMINOPHEN 650 MG: 325 TABLET ORAL at 11:29

## 2023-11-20 RX ADMIN — METHOCARBAMOL 500 MG: 500 TABLET ORAL at 08:46

## 2023-11-20 RX ADMIN — HYDROXYZINE HYDROCHLORIDE 25 MG: 25 TABLET, FILM COATED ORAL at 14:48

## 2023-11-20 RX ADMIN — Medication 400 MG: at 11:29

## 2023-11-20 RX ADMIN — METHOCARBAMOL 500 MG: 500 TABLET ORAL at 14:48

## 2023-11-20 RX ADMIN — METHOCARBAMOL 500 MG: 500 TABLET ORAL at 01:06

## 2023-11-20 RX ADMIN — KETOROLAC TROMETHAMINE 15 MG: 15 INJECTION, SOLUTION INTRAMUSCULAR; INTRAVENOUS at 14:48

## 2023-11-20 RX ADMIN — ACETAMINOPHEN 650 MG: 325 TABLET ORAL at 06:24

## 2023-11-20 RX ADMIN — KETOROLAC TROMETHAMINE 15 MG: 15 INJECTION, SOLUTION INTRAMUSCULAR; INTRAVENOUS at 08:46

## 2023-11-20 RX ADMIN — KETOROLAC TROMETHAMINE 15 MG: 15 INJECTION, SOLUTION INTRAMUSCULAR; INTRAVENOUS at 01:03

## 2023-11-20 RX ADMIN — OXYCODONE HYDROCHLORIDE 10 MG: 5 TABLET ORAL at 06:24

## 2023-11-20 RX ADMIN — NICOTINE POLACRILEX 4 MG: 4 GUM, CHEWING BUCCAL at 01:05

## 2023-11-20 RX ADMIN — HYDROXYZINE HYDROCHLORIDE 25 MG: 25 TABLET, FILM COATED ORAL at 08:50

## 2023-11-20 ASSESSMENT — ACTIVITIES OF DAILY LIVING (ADL)
ADLS_ACUITY_SCORE: 18

## 2023-11-20 NOTE — TELEPHONE ENCOUNTER
Patient currently admitted at [facility], wondering about discharge plan. Please call [facility] - patient's Nurse back at [phone].     Sangita Werner RN on 11/20/2023 at 9:33 AM

## 2023-11-20 NOTE — TELEPHONE ENCOUNTER
Nurse calling back with Dr. Waite's call back number: [phone].   Sangita Werner RN on 11/20/2023 at 11:23 AM

## 2023-11-20 NOTE — PROGRESS NOTES
Obstructing calculus in the left mid ureter with mild left hydroureteronephrosis.    S/p stent with Dr Vasquez  11/18    Orders placed for ureteroscopy to treat stone    Landon Noland MD

## 2023-11-20 NOTE — DISCHARGE SUMMARY
[facility]  Hospitalist Discharge Summary      Date of Admission:  11/17/2023  Date of Discharge:  11/20/2023  Discharging Provider: Marcel Waite DO  Discharge Service: Hospitalist Service    Discharge Diagnoses   Infected left ureteral stone  Acute pyelonephritis  Mild left hydroureteronephrosis  Severe sepsis secondary to urinary tract infection  Status post left ureteral stent placement  Tobacco dependence  Normocytic anemia  Anxiety  OCD  History of ovarian cyst    Clinically Significant Risk Factors          Follow-ups Needed After Discharge   Follow-up Appointments     Follow-up and recommended labs and tests       Follow up with [facility], at (location with clinic   name or city) in 1-2 weeks. The following labs/tests are recommended: for   stent and stone removal. Scheduled for 11/30/23.  Establish with PCP in 7 days for recent hospitalization and to establish   primary care.            Unresulted Labs Ordered in the Past 30 Days of this Admission       Date and Time Order Name Status Description    11/18/2023  8:04 AM Fungal or Yeast Culture Routine Preliminary     11/17/2023 10:31 PM Blood Culture Line, venous Preliminary     11/17/2023 10:31 PM Blood Culture Peripheral Blood Preliminary         These results will be [place] up by [facility] [place]    Discharge Disposition   Discharged to home  Condition at discharge: Stable    Hospital Course   43-year-old female with history of ovarian cysts, presenting on 11/17/2023 with left flank pain.  CT and ultrasound consistent with infected left ureteral cyst, pyelonephritis, and possible developing abscess in the left kidney.  Patient underwent cystoscopy with ureteral stent insertion on 11/18/2023.  Treated for severe sepsis secondary to UTI.  Urine culture grew E.coli pansensitive organism.  Urology to follow on 11/30 for stent and stone removal.    Consultations This Hospital Stay   UROLOGY IP CONSULT  UROLOGY IP  CONSULT  PHARMACY TO DOSE VANCO  PAIN MANAGEMENT ADULT IP CONSULT    Code Status   Full Code    Time Spent on this Encounter   I, Marcel Waite DO, personally saw the patient today and spent greater than 30 minutes discharging this patient.       Marcel Waite DO  [address] [address]  Phone: [phone]  Fax: [phone]  ______________________________________________________________________    Physical Exam   Vital Signs: Temp: 97.9  F (36.6  C) Temp src: Oral BP: 136/86 Pulse: 85   Resp: 16 SpO2: 96 % O2 Device: None (Room air)    Weight: 119 lbs 8 oz  General Appearance:  Alert, cooperative, no distress  Head:    Normocephalic, without obvious abnormality, atraumatic  EENT:  PERRL, conjunctiva/corneas clear, EOM's intact.   Neck:    Supple, symmetrical, trachea midline, no adenopathy; no NVE  Back:  Symmetric, no curvature, no CVA tenderness  Chest/Lungs: Clear to auscultation bilaterally, respirations unlabored, No tenderness or deformity. No abdominal breathing or use of accessory muscles.   Heart:    Regular rate and rhythm, S1 and S2 normal, no murmur, rub   or gallop  Abdomen: Soft, non-tender, bowel sounds active all four quadrants, not peritoneal on palpation. Not distended - left flank pain   Extremities:  Extremities normal, atraumatic, no swelling   Skin:  Skin color, texture, turgor normal, no rashes or lesion  Neurologic:  Awake and alert, No lateralizing or localizing signs        Primary Care Physician   Physician No Ref-Primary    Discharge Orders      Primary Care Referral      Reason for your hospital stay    Ureteral stone, hydroureteronephrosis, sepsis secondary to pyelonephritis secondary to E.coli.     Follow-up and recommended labs and tests     Follow up with [facility], at (location with clinic name or city) in 1-2 weeks. The following labs/tests are recommended: for stent and stone  removal. Scheduled for 11/30/23.  Establish with PCP in 7 days for recent hospitalization and to establish primary care.     Activity    Your activity upon discharge: activity as tolerated     When to contact your care team    Call your primary doctor if you have any of the following: temperature greater than 100.4, or increased pain.     Diet    Follow this diet upon discharge: Orders Placed This Encounter      Regular Diet Adult       Significant Results and Procedures   Most Recent 3 CBC's:  Recent Labs   Lab Test 11/20/23  0612 11/19/23  0904 11/18/23  0531   WBC 7.6 24.6* 19.2*   HGB 9.2* 9.5* 10.4*   MCV 88 91 86    187 156     Most Recent 3 BMP's:  Recent Labs   Lab Test 11/20/23  0612 11/19/23  0652 11/18/23 2027 11/18/23  0531    139  --  138   POTASSIUM 4.5 4.8  4.8 4.5 3.4   CHLORIDE 112* 110*  --  107   CO2 22 22  --  23   BUN 15.5 14.5  --  13.1   CR 0.85 0.72  0.72  --  0.80   ANIONGAP 6* 7  --  8   KALEB 8.2* 8.8  --  8.0*   GLC 92 120*  --  98     Most Recent 2 LFT's:  Recent Labs   Lab Test 11/18/23  0531 11/17/23 2051   AST 23 13   ALT 15 <5   ALKPHOS 62 59   BILITOTAL 0.3 0.3     7-Day Micro Results       Collected Updated Procedure Result Status      11/18/2023 0744 11/19/2023 0713 Urine Culture [[id number]]   (Abnormal)   Urine, Straight Catheter    Final result Component Value   Culture >100,000 CFU/mL Escherichia coli    Susceptibilities done on previous cultures               11/18/2023 0744 11/20/2023 1246 Fungal or Yeast Culture Routine [[id number]]   Urine, Straight Catheter    Preliminary result Component Value   Culture No growth after 2 days  [P]                11/17/2023 2315 11/20/2023 1005 Blood Culture Line, venous [[id number]]   Blood from Line, venous    Preliminary result Component Value   Culture No growth after 2 days  [P]                11/17/2023 2257 11/20/2023 0031 Blood Culture Peripheral Blood [[id number]]   Peripheral Blood    Preliminary result  Component Value   Culture No growth after 2 days  [P]                11/17/2023 2051 11/19/2023 0221 Urine Culture [[id number]]    (Abnormal)   Urine, Clean Catch    Final result Component Value   Culture >100,000 CFU/mL Escherichia coli        Susceptibility        Escherichia coli      NASRA      Ampicillin 4 ug/mL Susceptible      Ampicillin/ Sulbactam <=2 ug/mL Susceptible      Cefazolin <=4 ug/mL Susceptible  [1]       Cefepime <=1 ug/mL Susceptible      Cefoxitin <=4 ug/mL Susceptible      Ceftazidime <=1 ug/mL Susceptible      Ceftriaxone <=1 ug/mL Susceptible      Ciprofloxacin <=0.25 ug/mL Susceptible      Gentamicin <=1 ug/mL Susceptible      Levofloxacin <=0.12 ug/mL Susceptible      Nitrofurantoin <=16 ug/mL Susceptible      Piperacillin/Tazobactam <=4 ug/mL Susceptible      Tobramycin <=1 ug/mL Susceptible      Trimethoprim/Sulfamethoxazole <=1/19 ug/mL Susceptible                   [1]  Cefazolin NASRA breakpoints are for the treatment of uncomplicated urinary tract infections. For the treatment of systemic infections, please contact the laboratory for additional testing.                         Most Recent Urinalysis:  Recent Labs   Lab Test 11/17/23 2051   COLOR Dark Yellow*   APPEARANCE Turbid*   URINEGLC Negative   URINEBILI Negative   URINEKETONE Negative   SG 1.007   UBLD 1.0 mg/dL*   URINEPH 5.5   PROTEIN 20*   NITRITE Positive*   LEUKEST 500 Stephen/uL*   RBCU 22*   WBCU 127*   ,   Results for orders placed or performed during the hospital encounter of 11/17/23   CT Abdomen Pelvis w Contrast    Narrative    EXAM: CT ABDOMEN PELVIS W CONTRAST  LOCATION: [facility]  DATE: 11/17/2023    INDICATION: Left flank pain.  COMPARISON: CT abdomen/pelvis 04/11/2019.  TECHNIQUE: CT scan of the abdomen and pelvis was performed following injection of IV contrast. Multiplanar reformats were obtained. Dose reduction techniques were used.  CONTRAST: Yes.    FINDINGS:   LOWER CHEST:  Normal.    HEPATOBILIARY: Tiny right hepatic hypodensity is too small to characterize. Prior cholecystectomy. Mild prominence of the extrahepatic ducts likely related to postcholecystectomy state.    PANCREAS: Normal.    SPLEEN: Normal.    ADRENAL GLANDS: Normal.    KIDNEYS/BLADDER: There is a 0.4 cm obstructing calculus in the left mid ureter (series 5, image 35). Mild left hydroureteronephrosis. Asymmetric left urothelial thickening and enhancement. Patchy areas of bilateral renal cortical hypoenhancement, left   greater than right. Left renal cyst. There are more focal areas of hypodensity within the left kidney (series 3, images 69, 70, and 73 for example) not definitely seen on prior CT, which could represent cysts or areas of lobar nephronia/developing   abscesses. Stable right renal hypodensities favored for cysts. Mildly distended urinary bladder is unremarkable.    BOWEL: No bowel obstruction. Normal appendix. Trace pelvic ascites. No pneumoperitoneum.    LYMPH NODES: Normal.    VASCULATURE: Normal caliber abdominal aorta without significant atherosclerotic plaque.    PELVIC ORGANS: Right corpus luteum.    MUSCULOSKELETAL: Thoracolumbar spondylosis. No destructive osseous lesion. Partially visualized bilateral breast implants.      Impression    IMPRESSION:   1.  Obstructing calculus in the left mid ureter with mild left hydroureteronephrosis.  2.  Patchy bilateral renal hypoenhancement, left greater than right, compatible with pyelonephritis. Few more focal areas of hypodensity in the left kidney were not definitely seen on prior CT and could represent cysts or areas of lobar   nephronia/developing abscesses.   US Kidney Left    Narrative    EXAM: US KIDNEY LEFT  LOCATION: [facility]  DATE: 11/18/2023    INDICATION: evaluate for renal abscess, abnormal CT  COMPARISON: CT 11/17/2023.  TECHNIQUE: Routine Left Renal and Bladder Ultrasound.    FINDINGS:    LEFT KIDNEY: 11.1 x 5.7 x  5.8 cm. There is mild dilatation of the left renal collecting system. There is a complex fluid collection in the mid kidney measuring 2.6 x 3.1 x 2.0 cm. No other focal renal lesions are evident.     BLADDER: Not imaged.      Impression    IMPRESSION:  3.1 cm complex fluid collection in the mid left kidney may be a complex cyst or abscess.   XR Surgery BARB  Fluoro G/T 5 Min    Narrative    This exam was marked as non-reportable because it will not be read by a   radiologist or a [place] non-radiologist provider.             Discharge Medications   Current Discharge Medication List        START taking these medications    Details   !! acetaminophen (TYLENOL) 325 MG tablet Take 2 tablets (650 mg) by mouth every 4 hours as needed for mild pain, other, headaches or fever (and adjunct with moderate or severe pain or per patient request)    Associated Diagnoses: Left flank pain; Pyelonephritis of left kidney; Left ureteral stone      cefdinir (OMNICEF) 300 MG capsule Take 1 capsule (300 mg) by mouth 2 times daily  Qty: 28 capsule, Refills: 0    Associated Diagnoses: Pyelonephritis of left kidney      hydrOXYzine (ATARAX) 25 MG tablet Take 1 tablet (25 mg) by mouth 3 times daily as needed for itching or anxiety  Qty: 21 tablet, Refills: 0    Associated Diagnoses: Left flank pain      ibuprofen (ADVIL/MOTRIN) 600 MG tablet Take 1 tablet (600 mg) by mouth every 6 hours as needed for moderate pain  Qty: 28 tablet, Refills: 0    Associated Diagnoses: Left flank pain      methocarbamol (ROBAXIN) 500 MG tablet Take 1 tablet (500 mg) by mouth 3 times daily as needed for muscle spasms  Qty: 21 tablet, Refills: 0    Associated Diagnoses: Left flank pain      oxyCODONE (ROXICODONE) 5 MG tablet Take 1-2 tablets (5-10 mg) by mouth every 6 hours as needed for severe pain or moderate pain  Qty: 12 tablet, Refills: 0    Associated Diagnoses: Left flank pain      polyethylene glycol (MIRALAX) 17 GM/Dose powder Take 17 g by mouth daily  for 7 days  Qty: 119 g, Refills: 0    Associated Diagnoses: Left flank pain      senna-docusate (SENOKOT-S/PERICOLACE) 8.6-50 MG tablet Take 1 tablet by mouth 2 times daily as needed for constipation  Qty: 14 tablet, Refills: 0    Associated Diagnoses: Left flank pain      tamsulosin (FLOMAX) 0.4 MG capsule Take 1 capsule (0.4 mg) by mouth daily  Qty: 14 capsule, Refills: 0    Associated Diagnoses: Left flank pain; Left ureteral stone       !! - Potential duplicate medications found. Please discuss with provider.        CONTINUE these medications which have NOT CHANGED    Details   !! acetaminophen (TYLENOL) 500 MG tablet Take 500-1,000 mg by mouth every 6 hours as needed for mild pain      acyclovir (ZOVIRAX) 400 MG tablet [ACYCLOVIR (ZOVIRAX) 400 MG TABLET] Take 400 mg by mouth 2 (two) times a day as needed.      albuterol (PROAIR HFA/PROVENTIL HFA/VENTOLIN HFA) 108 (90 Base) MCG/ACT inhaler Inhale 2 puffs into the lungs every 6 hours as needed for shortness of breath, wheezing or cough      varenicline (CHANTIX ALYSSA) 0.5 MG X 11 & 1 MG X 42 tablet Take 1 mg by mouth 2 times daily Take 0.5 mg tab daily for 3 days, THEN 0.5 mg tab twice daily for 4 days, THEN 1 mg twice daily.       !! - Potential duplicate medications found. Please discuss with provider.        Allergies   No Known Allergies

## 2023-11-20 NOTE — CONSULTS
[facility] ACUTE PAIN SERVICE CONSULTATION     Date of Admission:  11/17/2023  Date of Consult (When I saw the patient): 11/20/23  Physician requesting consult: Dr. Waite   Reason for consult:  dysuria, ureteral stent pain     Assessment/Plan:     Danielle Jarrell is a 43 year old female who was admitted on 11/17/2023. Acute pain Team was asked to see the patient for ureteral stent pain. Admitted for pyelonephritis of left kidney, calculus of kidney, left flank pain, left ureteral stone, UTI, dysuria, fevers, chills. History including kidney stones, anxiety, OCD, ovarian cyst.   Pain began a couple of days prior to admission. Urology consulted for infected ureteral stone and now s/p stent placed 11/18/23.   Describes pain as 4-6/10 and aching in flank as well as burning and pain with urination. The patient denies nausea, vomiting, constipation, diarrhea, chest pain, shortness of breath. The patient does smoke and denies chemical dependency history.      Post op day: 2 Days Post-Op.  Cystoscopy with left ureteral stent insertion.     Opioid Induced Respiratory Depression Risk Assessment:?  Moderate: post-op, multiple opioid therapies, concomitant CNS depressants.?     The patient's home MME was zero mg daily. In the last 24 hours, patient has utilized 50 mg oxycodone in the last 24 hours, MME about 75 mg.    Plans for discharge, pain team will sign off     PLAN:   1) Pain is consistent with infected ureteral stone and now s/p stent placed 11/18/23. Labs and imaging indicated: I have personally reviewed pertinent notes, labs, tests, and radiologic imaging in patient's chart. Treatment plan includes multimodal pain approach, Hospital Medicine Service for medical management, Urology. Patient educated regarding multimodal pain approach, medications as listed below. Patient is understanding of the plan. All questions and concerns addressed to patient's satisfaction.   2)Multimodal Medication Therapy  Topical:  none   Muscle relaxant: methocarbamol 500 mg tid prn muscle spasms.  NSAIDs: Toradol 15 mg IV q 6 h prn inflammatory pain  Adjuvants: Tylenol prn, calcium carbonate 1000 mg qid prn heart burn  Antidepressants/anxiolytics: Atarax 25 mg tid prn itching, anxiety  Opioids: oxycodone 5-10 mg q 4 h prn  IV Pain medication: IV Dilaudid IV 0.2 mg q 8 h prn severe pain  3)Non-medication interventions: Ice, Heat, physical therapy,  4)Constipation Prophylaxis: senna/docusate 1-2 bid prn    -Opioid prescriber has been none.   PCP:  No Ref-Primary, Physician   MN  pulled 11/19/23, no opioids on report.  -Discharge Recommendations - We recommend prescribing the following at the time of discharge: Oxycodone 5 mg x8-12 tabs, APAP, robaxin prn, NSAIDs     History of Present Illness (HPI):       Danielle Jarrell is a 43 year old female who presented for evaluation of flank pain, and UTI. She ha\d cystoscopy and stent insertion on 11/18/23.   Past medical history as above. The pain is reported to be acute, located in the left flank. Current pain is rated at 4-6/10 and goal is 0/10.      Per MN  review, the patient does not have an opioid tolerance.       Reviewed medical record, labs, imaging, ED note, and care everywhere.     Past pain treatments have included: Pain Clinic-no, APAP, NSAIDs     Home pain medications/psych medications/anticoagulation medications include: Tylenol, NSAIDs    Medical History   PAST MEDICAL HISTORY: History reviewed.     PAST SURGICAL HISTORY:   Past Surgical History:   Procedure Laterality Date    CHOLECYSTECTOMY      CHOLECYSTECTOMY      1 year ago     COLON SURGERY      polyp removal 2 1/2 years ago     KIDNEY STONE SURGERY      1 1/2 year ago at [facility]     TONSILLECTOMY         FAMILY HISTORY:   Family History   Problem Relation Age of Onset    No Known Problems Mother     No Known Problems Father        SOCIAL HISTORY:   Social History     Tobacco Use    Smoking status: Former     Types:  Cigarettes     Quit date: 10/27/2009     Years since quittin.0    Smokeless tobacco: Current   Substance Use Topics    Alcohol use: Yes     Alcohol/week: 0.0 standard drinks of alcohol     Comment: Alcoholic Drinks/day: 2-3 drinks about 5 days a week        [employer] LIFEST[employer]ACTICES  Tobacco:  reports that she quit smoking about 14 years ago. She uses smokeless tobacco.  Alcohol:  reports current alcohol use.  Illicit drugs:  reports no history of drug use.    Allergies  No Known Allergies    Problem List  Patient Active Problem List    Diagnosis Date Noted    Left flank pain 2023     Priority: Medium    Left ureteral stone 2023     Priority: Medium    Pyelonephritis of left kidney 2023     Priority: Medium    Elevated liver enzymes 10/28/2014     Priority: Medium    OCD (obsessive compulsive disorder)      Priority: Medium    Anxiety      Priority: Medium    Alcohol abuse, episodic      Priority: Medium     DUI 2006        Abdominal pain 10/27/2014     Priority: Medium    Elevated lipase 10/27/2014     Priority: Medium    UTI (lower urinary tract infection) 10/27/2014     Priority: Medium     Replacement Utility updated for latest IMO load        Pancreatitis 10/27/2014     Priority: Medium    Pneumonia 2013     Priority: Medium    Migraine 2013     Priority: Medium    Ovarian cyst 2013     Priority: Medium    Calculus of kidney 2013     Priority: Medium    Tobacco use disorder 2013     Priority: Medium    Isolated or specific phobia- flying 2012     Priority: Medium       Prior to Admission Medications   Medications Prior to Admission   Medication Sig Dispense Refill Last Dose    acetaminophen (TYLENOL) 500 MG tablet Take 500-1,000 mg by mouth every 6 hours as needed for mild pain   2023    acyclovir (ZOVIRAX) 400 MG tablet [ACYCLOVIR (ZOVIRAX) 400 MG TABLET] Take 400 mg by mouth 2 (two) times a day as needed.   Past Month    albuterol (PROAIR  HFA/PROVENTIL HFA/VENTOLIN HFA) 108 (90 Base) MCG/ACT inhaler Inhale 2 puffs into the lungs every 6 hours as needed for shortness of breath, wheezing or cough   Past Month    varenicline (CHANTIX ALYSSA) 0.5 MG X 11 & 1 MG X 42 tablet Take 1 mg by mouth 2 times daily Take 0.5 mg tab daily for 3 days, THEN 0.5 mg tab twice daily for 4 days, THEN 1 mg twice daily.   not started       Review of Systems  Complete ROS reviewed, unless noted in HPI, all other systems reviewed (with patient) and all others found to be negative.      Objective:     Physical Exam:  /86   Pulse 85   Temp 97.9  F (36.6  C) (Oral)   Resp 16   Ht 1.524 m (5')   Wt 54.2 kg (119 lb 8 oz)   LMP 10/31/2023 (Approximate)   SpO2 96%   BMI 23.34 kg/m    Weight:   Vitals:    11/17/23 1953 11/20/23 0543   Weight: 47.6 kg (105 lb) 54.2 kg (119 lb 8 oz)      Body mass index is 23.34 kg/m .    General Appearance:  Alert, cooperative, no distress, ambulating in room    Head:  Normocephalic, without obvious abnormality, atraumatic   Eyes:  PERRL, conjunctiva/corneas clear, EOM's intact   ENT/Throat: Lips, mucosa, and tongue normal; teeth and gums normal   Lymph/Neck: Supple, symmetrical, trachea midline   Lungs:   Clear to auscultation bilaterally, respirations unlabored, room air    Cardiovascular/Heart:  Regular rate and rhythm, S1, S2 normal,no murmur, rub or gallop.    Abdomen:   Soft, non-tender, bowel sounds active all four quadrants   Musculoskeletal: Extremities normal, atraumatic   Skin: Skin color, texture, turgor normal   Neurologic: Alert and oriented X 3, Moves all 4 extremities     Psych: Affect is appropriate     Imaging: Reviewed I have personally reviewed pertinent labs, tests, and radiologic imaging in patient's chart.    Labs: Reviewed I have personally reviewed pertinent labs, tests, and radiologic imaging in patient's chart.      Total time spent 65 minutes with greater than 50% in consultation, education and coordination of  care.   Also discussed with RN, pharmacist.   Elements of Medical Decision Making as described above. Acute or chronic illness or injury or surgery. High risk therapy including opioids, high risk drug therapy including oral and/or parenteral controlled substances.     Thank you for this consultation.    VIDHI NewP-C  Acute Care Pain Management Program [facility]   Monday-Friday 8a-4p   Page via online paging system or [employer]

## 2023-11-20 NOTE — PROGRESS NOTES
[facility]    Medicine Progress Note - Hospitalist Service    Date of Admission:  11/17/2023    Assessment & Plan   43-year-old female with history of ovarian cysts, presenting on 11/17/2023 with left flank pain.  CT and ultrasound consistent with infected left ureteral cyst, pyelonephritis, and possible developing abscess in the left kidney.  Patient underwent cystoscopy with ureteral stent insertion on 11/18/2023.  Treated for severe sepsis secondary to UTI.     Infected left ureteral stone  Pyelonephritis  Mild left hydroureteronephrosis  Severe sepsis secondary to UTI  Status post left ureteral stent placement.  Urine culture growing E.coli. pansusceptible.   11/19/2023 WBC increased to 24.6 from 19.2.  Continue ceftriaxone 2 g IV every 24 hours  Question if patient will need reimaging given pain and elevation in WBC.  Continue APAP, Ketorolac, hydroxyzine, oxycodone.  Pain service consultation.    Tobacco dependence  Plan to start Chantix at discharge.     Normocytic anemia  Hemoglobin 9.2 g/dL.   Establish with PCP.     Anxiety  OCD  History of ovarian cyst        Diet: Regular Diet Adult    DVT Prophylaxis: Pneumatic Compression Devices  Painter Catheter: Not present  Lines: None     Cardiac Monitoring: None  Code Status: Full Code      Clinically Significant Risk Factors              # Hypoalbuminemia: Lowest albumin = 3.1 g/dL at 11/18/2023  5:31 AM, will monitor as appropriate                       Disposition Plan      Expected Discharge Date: 11/20/2023                    Marcel Waite DO  Hospitalist Service  [facility]  Securely message with [employer] (more info)  Text page via [employer] Paging/Directory   ______________________________________________________________________    Interval History   Monik reports improved lower abdominal and flank tenderness with current pain medications.  No fever or chills.     Physical Exam   Vital Signs: Temp: 97.9  F  (36.6  C) Temp src: Oral BP: 136/86 Pulse: 85   Resp: 16 SpO2: 96 % O2 Device: None (Room air)    Weight: 119 lbs 8 oz    General Appearance:  Alert, cooperative, no distress  Head:    Normocephalic, without obvious abnormality, atraumatic  EENT:  PERRL, conjunctiva/corneas clear, EOM's intact.   Neck:    Supple, symmetrical, trachea midline, no adenopathy; no NVE  Back:  Symmetric, no curvature, no CVA tenderness  Chest/Lungs: Clear to auscultation bilaterally, respirations unlabored, No tenderness or deformity. No abdominal breathing or use of accessory muscles.   Heart:    Regular rate and rhythm, S1 and S2 normal, no murmur, rub   or gallop  Abdomen: Soft, non-tender, bowel sounds active all four quadrants, not peritoneal on palpation. Not distended - left flank pain   Extremities:  Extremities normal, atraumatic, no swelling   Skin:  Skin color, texture, turgor normal, no rashes or lesion  Neurologic:  Awake and alert, No lateralizing or localizing signs     Medical Decision Making       40 MINUTES SPENT BY ME on the date of service doing chart review, history, exam, documentation & further activities per the note.      Data     I have personally reviewed the following data over the past 24 hrs:    7.6  \   9.2 (L)   / 167     140 112 (H) 15.5 /  92   4.5 22 0.85 \       Imaging results reviewed over the past 24 hrs:   No results found for this or any previous visit (from the past 24 hour(s)).

## 2023-11-20 NOTE — PROGRESS NOTES
Care Management Follow Up    Length of Stay (days): 3    Expected Discharge Date: 11/20/2023     Concerns to be Addressed:   Discharge Planning    Patient plan of care discussed at interdisciplinary rounds: Yes    Additional Information:  CM reviewed chart review, anticipate discharge home 11/21 on PO ABX and no discharge needs.    Friends/family transport at discharge.     CM/KENDALL following along.     Ashley Chakraborty CM

## 2023-11-20 NOTE — PROVIDER NOTIFICATION
Pt discharged to home with family as transport Discussed AVS and all questions answered. Returned all belongings.

## 2023-11-20 NOTE — PLAN OF CARE
Pt's pain has improved this evening with changes in pain regiment. She still experiences some dysuria but this discomfort has decreased in severity throughout the day. Pt is ambulating independently throughout the day and frequently walks in the room and around the unit. She has had 3 BM's today but mentioned she normally goes 6-9 times a day. Bed in lowest position and call light is within reach. Pt calls appropriately.       Goal Outcome Evaluation:         Problem: Adult Inpatient Plan of Care  Goal: Absence of Hospital-Acquired Illness or Injury  Outcome: Progressing  Intervention: Identify and Manage Fall Risk  Recent Flowsheet Documentation  Taken 11/19/2023 1734 by Chelo Villatoro RN  Safety Promotion/Fall Prevention: safety round/check completed  Taken 11/19/2023 0823 by Chelo Villatoro RN  Safety Promotion/Fall Prevention: safety round/check completed  Intervention: Prevent and Manage VTE (Venous Thromboembolism) Risk  Recent Flowsheet Documentation  Taken 11/19/2023 1734 by Chelo Villatoro RN  VTE Prevention/Management: SCDs (sequential compression devices) on  Taken 11/19/2023 0823 by Chelo Villatoro RN  VTE Prevention/Management: SCDs (sequential compression devices) on  Goal: Optimal Comfort and Wellbeing  Outcome: Progressing  Intervention: Monitor Pain and Promote Comfort  Recent Flowsheet Documentation  Taken 11/19/2023 0908 by Chelo Villatoro RN  Pain Management Interventions: medication (see MAR)  Taken 11/19/2023 0823 by Chelo Villatoro RN  Pain Management Interventions: medication (see MAR)     Problem: Fall Injury Risk  Goal: Absence of Fall and Fall-Related Injury  Outcome: Progressing  Intervention: Promote Injury-Free Environment  Recent Flowsheet Documentation  Taken 11/19/2023 1734 by Chelo Villatoro RN  Safety Promotion/Fall Prevention: safety round/check completed  Taken 11/19/2023 0823 by Chelo Villatoro RN  Safety Promotion/Fall Prevention: safety round/check completed     Problem:  Infection  Goal: Absence of Infection Signs and Symptoms  Outcome: Progressing

## 2023-11-20 NOTE — PROGRESS NOTES
Pt c/o pain in right flank and with urination. Pain is improved with IV/PO meds. Pt ambulating independently around unit.

## 2023-11-20 NOTE — PLAN OF CARE
Continues with left flak pain, managed with PRN oxycodone, tylenol, toradol, robaxin, and ativan. Utilizing nicotine gum for tobacco cravings. Up ambulating halls several times throughout night. Swelling decreasing in BLLE. Lung sounds remain clear. IVSL between ABX. Calls appropriately. Mg/K protocols, labs to be drawn this morning. WBC back to normal range!    Problem: Fluid Volume Excess  Goal: Fluid Balance  Outcome: Progressing     Problem: UTI (Urinary Tract Infection)  Goal: Improved Infection Symptoms  11/20/2023 0344 by Amie Hill RN  Outcome: Progressing  11/20/2023 0342 by Amie Hill RN  Outcome: Progressing     Problem: Pain Acute  Goal: Optimal Pain Control and Function  11/20/2023 0344 by Amie Hill RN  Outcome: Progressing  11/20/2023 0342 by Amie Hill RN  Outcome: Progressing  Intervention: Develop Pain Management Plan  Recent Flowsheet Documentation  Taken 11/20/2023 0106 by Amie Hill RN  Pain Management Interventions: medication (see MAR)  Taken 11/19/2023 2054 by Amie Hill RN  Pain Management Interventions: medication (see MAR)  Intervention: Prevent or Manage Pain  Recent Flowsheet Documentation  Taken 11/20/2023 0115 by Amie Hill RN  Medication Review/Management: high-risk medications identified  Taken 11/19/2023 1941 by Amie Hill RN  Medication Review/Management: high-risk medications identified   Goal Outcome Evaluation:

## 2023-11-21 ENCOUNTER — TELEPHONE (OUTPATIENT)
Dept: UROLOGY | Facility: CLINIC | Age: 43
End: 2023-11-21
Payer: MEDICAID

## 2023-11-22 ENCOUNTER — TELEPHONE (OUTPATIENT)
Dept: UROLOGY | Facility: CLINIC | Age: 43
End: 2023-11-22
Payer: MEDICAID

## 2023-11-22 LAB — BACTERIA UR CULT: NO GROWTH

## 2023-11-23 LAB
BACTERIA BLD CULT: NO GROWTH
BACTERIA BLD CULT: NO GROWTH

## 2023-11-27 ENCOUNTER — TELEPHONE (OUTPATIENT)
Dept: UROLOGY | Facility: CLINIC | Age: 43
End: 2023-11-27
Payer: MEDICAID

## 2023-11-29 ENCOUNTER — TELEPHONE (OUTPATIENT)
Dept: UROLOGY | Facility: CLINIC | Age: 43
End: 2023-11-29
Payer: MEDICAID

## 2023-11-29 DIAGNOSIS — N20.1 CALCULUS OF URETER: ICD-10-CM

## 2023-11-29 DIAGNOSIS — R10.9 LEFT FLANK PAIN: ICD-10-CM

## 2023-11-29 DIAGNOSIS — N20.1 LEFT URETERAL STONE: ICD-10-CM

## 2023-11-29 DIAGNOSIS — N12 PYELONEPHRITIS OF LEFT KIDNEY: ICD-10-CM

## 2023-11-29 DIAGNOSIS — R74.8 ELEVATED LIVER ENZYMES: ICD-10-CM

## 2023-11-29 RX ORDER — HYDROXYZINE HYDROCHLORIDE 25 MG/1
25 TABLET, FILM COATED ORAL 3 TIMES DAILY PRN
Qty: 21 TABLET | Refills: 0 | Status: SHIPPED | OUTPATIENT
Start: 2023-11-29

## 2023-11-29 RX ORDER — OXYBUTYNIN CHLORIDE 5 MG/1
5 TABLET ORAL 3 TIMES DAILY
Qty: 21 TABLET | Refills: 0 | Status: SHIPPED | OUTPATIENT
Start: 2023-11-29 | End: 2023-12-07

## 2023-11-29 RX ORDER — CEFDINIR 300 MG/1
300 CAPSULE ORAL 2 TIMES DAILY
Qty: 28 CAPSULE | Refills: 0 | Status: SHIPPED | OUTPATIENT
Start: 2023-11-29

## 2023-11-29 RX ORDER — TAMSULOSIN HYDROCHLORIDE 0.4 MG/1
0.4 CAPSULE ORAL DAILY
Qty: 14 CAPSULE | Refills: 0 | Status: SHIPPED | OUTPATIENT
Start: 2023-11-29 | End: 2023-12-07

## 2023-11-29 RX ORDER — OXYCODONE HYDROCHLORIDE 5 MG/1
5 TABLET ORAL EVERY 6 HOURS PRN
Qty: 6 TABLET | Refills: 0 | Status: SHIPPED | OUTPATIENT
Start: 2023-11-29 | End: 2023-12-02

## 2023-11-29 RX ORDER — IBUPROFEN 600 MG/1
600 TABLET, FILM COATED ORAL EVERY 6 HOURS PRN
Qty: 15 TABLET | Refills: 0 | Status: SHIPPED | OUTPATIENT
Start: 2023-11-29 | End: 2023-12-07

## 2023-11-29 NOTE — TELEPHONE ENCOUNTER
Health Call Center    Phone Message    May a detailed message be left on voicemail: yes     Reason for Call: Medication Refill Request    Has the patient contacted the pharmacy for the refill? Yes   Name of medication being requested:   -cefdinir 300mg  -tamsulosin (FLOMAX) 0.4 MG capsule  -Ibuproen 600mg  -Oxycodone 5mg  -hydrOXYzine (ATARAX) 25 MG tablet  Provider who prescribed the medication: Marcel Waite DO   Pharmacy:     [facility]/pharmacy #[id number] [place], [place] - [address] AT [facility] [address] (Ph: [phone])   Date medication is needed: 12/1/23    Patient calls because they will be out of medications by Saturday 12/2, or are already out. Patient is reporting burning and pain with urination and was not sure if she needed to stay on medications until surgery, which she is working on getting scheduled. Prescribing provider said that patient needs to reach out to Urology to see if they will authorize any refills.    Action Taken: Message routed to:  [facility] ([facility] Urology    Travel Screening: Not Applicable

## 2023-11-29 NOTE — TELEPHONE ENCOUNTER
Patient currently has a stent in and will have surgery coming up next week.  She is requesting a refill of pain medication to treat stent pain.  Madonna Sellers RN

## 2023-11-30 ENCOUNTER — TELEPHONE (OUTPATIENT)
Dept: UROLOGY | Facility: CLINIC | Age: 43
End: 2023-11-30
Payer: MEDICAID

## 2023-11-30 PROBLEM — N20.0 NEPHROLITHIASIS: Status: ACTIVE | Noted: 2023-11-20

## 2023-12-01 ENCOUNTER — TELEPHONE (OUTPATIENT)
Dept: UROLOGY | Facility: CLINIC | Age: 43
End: 2023-12-01
Payer: MEDICAID

## 2023-12-04 ENCOUNTER — TELEPHONE (OUTPATIENT)
Dept: UROLOGY | Facility: CLINIC | Age: 43
End: 2023-12-04
Payer: MEDICAID

## 2023-12-04 DIAGNOSIS — N20.1 CALCULUS OF URETER: Primary | ICD-10-CM

## 2023-12-04 RX ORDER — OXYCODONE HYDROCHLORIDE 5 MG/1
5 TABLET ORAL EVERY 6 HOURS PRN
Qty: 12 TABLET | Refills: 0 | Status: SHIPPED | OUTPATIENT
Start: 2023-12-04 | End: 2023-12-07

## 2023-12-04 NOTE — TELEPHONE ENCOUNTER
Spoke with patient who currently has a stent in.  She has been having terrible stent pain and despite taking protocol medications, she has needed to use an occasional oxycodone.  She was only given 6 on 11/29 and she is almost out.  She is requesting a refill, she has surgery on 12/7.  Madonna Sellers RN

## 2023-12-04 NOTE — TELEPHONE ENCOUNTER
Spoke with patient and completed surgery teaching.  Reviewed time, date and address of procedure.    Length of procedure and what to expect after, phone number given for any questions.  Went over no eating drinking, may have clear liquids 3 hours prior to check in.  Madonna Sellers RN     Abdomen soft, non-tender, no guarding.

## 2023-12-04 NOTE — TELEPHONE ENCOUNTER
Spoke with: Left several messages for patient to call back to confirm. Patient called back and LM on Srinivasan 12-3 and said any day will work.   Called LM 12-4 for patient to call back     Date of surgery:Thursday Dec 7 2023 with Dr Noland        Location: [facility]      Informed patient they will need a adult : YES      Pre op with provider: Recent OR Dr Noland will update DOS      H&P Scheduled in PAC- NA        Pre procedure covid : Not req      Additional imaging: NA        Surgery Packet : -         Please call for surgery teaching                           Additional comments:

## 2023-12-06 ENCOUNTER — ANESTHESIA EVENT (OUTPATIENT)
Dept: SURGERY | Facility: AMBULATORY SURGERY CENTER | Age: 43
End: 2023-12-06
Payer: COMMERCIAL

## 2023-12-07 ENCOUNTER — ANESTHESIA (OUTPATIENT)
Dept: SURGERY | Facility: AMBULATORY SURGERY CENTER | Age: 43
End: 2023-12-07
Payer: COMMERCIAL

## 2023-12-07 ENCOUNTER — HOSPITAL ENCOUNTER (OUTPATIENT)
Facility: AMBULATORY SURGERY CENTER | Age: 43
Discharge: HOME | End: 2023-12-07
Attending: STUDENT IN AN ORGANIZED HEALTH CARE EDUCATION/TRAINING PROGRAM
Payer: MEDICAID

## 2023-12-07 VITALS
SYSTOLIC BLOOD PRESSURE: 106 MMHG | RESPIRATION RATE: 16 BRPM | HEIGHT: 60 IN | WEIGHT: 110 LBS | HEART RATE: 79 BPM | DIASTOLIC BLOOD PRESSURE: 60 MMHG | TEMPERATURE: 98.7 F | BODY MASS INDEX: 21.6 KG/M2 | OXYGEN SATURATION: 94 %

## 2023-12-07 DIAGNOSIS — R10.9 LEFT FLANK PAIN: ICD-10-CM

## 2023-12-07 DIAGNOSIS — N20.0 NEPHROLITHIASIS: ICD-10-CM

## 2023-12-07 DIAGNOSIS — N20.1 LEFT URETERAL STONE: ICD-10-CM

## 2023-12-07 PROCEDURE — 82365 CALCULUS SPECTROSCOPY: CPT | Mod: 90 | Performed by: STUDENT IN AN ORGANIZED HEALTH CARE EDUCATION/TRAINING PROGRAM

## 2023-12-07 PROCEDURE — 52352 CYSTOURETERO W/STONE REMOVE: CPT | Mod: LT | Performed by: STUDENT IN AN ORGANIZED HEALTH CARE EDUCATION/TRAINING PROGRAM

## 2023-12-07 PROCEDURE — 99000 SPECIMEN HANDLING OFFICE-LAB: CPT | Performed by: STUDENT IN AN ORGANIZED HEALTH CARE EDUCATION/TRAINING PROGRAM

## 2023-12-07 RX ORDER — ONDANSETRON 4 MG/1
4 TABLET, ORALLY DISINTEGRATING ORAL EVERY 30 MIN PRN
Status: DISCONTINUED | OUTPATIENT
Start: 2023-12-07 | End: 2023-12-08 | Stop reason: HOSPADM

## 2023-12-07 RX ORDER — LIDOCAINE 40 MG/G
CREAM TOPICAL
Status: DISCONTINUED | OUTPATIENT
Start: 2023-12-07 | End: 2023-12-08 | Stop reason: HOSPADM

## 2023-12-07 RX ORDER — CEFAZOLIN SODIUM 2 G/100ML
2 INJECTION, SOLUTION INTRAVENOUS SEE ADMIN INSTRUCTIONS
Status: DISCONTINUED | OUTPATIENT
Start: 2023-12-07 | End: 2023-12-08 | Stop reason: HOSPADM

## 2023-12-07 RX ORDER — ONDANSETRON 2 MG/ML
4 INJECTION INTRAMUSCULAR; INTRAVENOUS EVERY 30 MIN PRN
Status: DISCONTINUED | OUTPATIENT
Start: 2023-12-07 | End: 2023-12-08 | Stop reason: HOSPADM

## 2023-12-07 RX ORDER — OXYCODONE HYDROCHLORIDE 5 MG/1
5 TABLET ORAL
Status: COMPLETED | OUTPATIENT
Start: 2023-12-07 | End: 2023-12-07

## 2023-12-07 RX ORDER — EPHEDRINE SULFATE 50 MG/ML
INJECTION, SOLUTION INTRAMUSCULAR; INTRAVENOUS; SUBCUTANEOUS PRN
Status: DISCONTINUED | OUTPATIENT
Start: 2023-12-07 | End: 2023-12-07

## 2023-12-07 RX ORDER — LIDOCAINE HYDROCHLORIDE 20 MG/ML
INJECTION, SOLUTION INFILTRATION; PERINEURAL PRN
Status: DISCONTINUED | OUTPATIENT
Start: 2023-12-07 | End: 2023-12-07

## 2023-12-07 RX ORDER — SODIUM CHLORIDE, SODIUM LACTATE, POTASSIUM CHLORIDE, CALCIUM CHLORIDE 600; 310; 30; 20 MG/100ML; MG/100ML; MG/100ML; MG/100ML
INJECTION, SOLUTION INTRAVENOUS CONTINUOUS
Status: DISCONTINUED | OUTPATIENT
Start: 2023-12-07 | End: 2023-12-08 | Stop reason: HOSPADM

## 2023-12-07 RX ORDER — PROPOFOL 10 MG/ML
INJECTION, EMULSION INTRAVENOUS PRN
Status: DISCONTINUED | OUTPATIENT
Start: 2023-12-07 | End: 2023-12-07

## 2023-12-07 RX ORDER — PROPOFOL 10 MG/ML
INJECTION, EMULSION INTRAVENOUS CONTINUOUS PRN
Status: DISCONTINUED | OUTPATIENT
Start: 2023-12-07 | End: 2023-12-07

## 2023-12-07 RX ORDER — OXYCODONE HYDROCHLORIDE 10 MG/1
10 TABLET ORAL
Status: DISCONTINUED | OUTPATIENT
Start: 2023-12-07 | End: 2023-12-08 | Stop reason: HOSPADM

## 2023-12-07 RX ORDER — KETOROLAC TROMETHAMINE 30 MG/ML
INJECTION, SOLUTION INTRAMUSCULAR; INTRAVENOUS PRN
Status: DISCONTINUED | OUTPATIENT
Start: 2023-12-07 | End: 2023-12-07

## 2023-12-07 RX ORDER — HYDROMORPHONE HCL IN WATER/PF 6 MG/30 ML
0.4 PATIENT CONTROLLED ANALGESIA SYRINGE INTRAVENOUS EVERY 5 MIN PRN
Status: DISCONTINUED | OUTPATIENT
Start: 2023-12-07 | End: 2023-12-08 | Stop reason: HOSPADM

## 2023-12-07 RX ORDER — HYDROMORPHONE HCL IN WATER/PF 6 MG/30 ML
0.2 PATIENT CONTROLLED ANALGESIA SYRINGE INTRAVENOUS EVERY 5 MIN PRN
Status: DISCONTINUED | OUTPATIENT
Start: 2023-12-07 | End: 2023-12-08 | Stop reason: HOSPADM

## 2023-12-07 RX ORDER — IBUPROFEN 600 MG/1
600 TABLET, FILM COATED ORAL EVERY 6 HOURS PRN
Qty: 15 TABLET | Refills: 0 | Status: SHIPPED | OUTPATIENT
Start: 2023-12-07

## 2023-12-07 RX ORDER — ONDANSETRON 2 MG/ML
INJECTION INTRAMUSCULAR; INTRAVENOUS PRN
Status: DISCONTINUED | OUTPATIENT
Start: 2023-12-07 | End: 2023-12-07

## 2023-12-07 RX ORDER — LABETALOL 20 MG/4 ML (5 MG/ML) INTRAVENOUS SYRINGE
10
Status: DISCONTINUED | OUTPATIENT
Start: 2023-12-07 | End: 2023-12-08 | Stop reason: HOSPADM

## 2023-12-07 RX ORDER — DEXAMETHASONE SODIUM PHOSPHATE 4 MG/ML
INJECTION, SOLUTION INTRA-ARTICULAR; INTRALESIONAL; INTRAMUSCULAR; INTRAVENOUS; SOFT TISSUE PRN
Status: DISCONTINUED | OUTPATIENT
Start: 2023-12-07 | End: 2023-12-07

## 2023-12-07 RX ORDER — ACETAMINOPHEN 325 MG/1
975 TABLET ORAL ONCE
Status: COMPLETED | OUTPATIENT
Start: 2023-12-07 | End: 2023-12-07

## 2023-12-07 RX ORDER — CEFAZOLIN SODIUM 2 G/100ML
2 INJECTION, SOLUTION INTRAVENOUS
Status: COMPLETED | OUTPATIENT
Start: 2023-12-07 | End: 2023-12-07

## 2023-12-07 RX ORDER — FENTANYL CITRATE 0.05 MG/ML
25 INJECTION, SOLUTION INTRAMUSCULAR; INTRAVENOUS EVERY 5 MIN PRN
Status: DISCONTINUED | OUTPATIENT
Start: 2023-12-07 | End: 2023-12-08 | Stop reason: HOSPADM

## 2023-12-07 RX ORDER — DEXMEDETOMIDINE HYDROCHLORIDE 4 UG/ML
INJECTION, SOLUTION INTRAVENOUS PRN
Status: DISCONTINUED | OUTPATIENT
Start: 2023-12-07 | End: 2023-12-07

## 2023-12-07 RX ORDER — FENTANYL CITRATE 50 UG/ML
INJECTION, SOLUTION INTRAMUSCULAR; INTRAVENOUS PRN
Status: DISCONTINUED | OUTPATIENT
Start: 2023-12-07 | End: 2023-12-07

## 2023-12-07 RX ORDER — FENTANYL CITRATE 0.05 MG/ML
50 INJECTION, SOLUTION INTRAMUSCULAR; INTRAVENOUS EVERY 5 MIN PRN
Status: DISCONTINUED | OUTPATIENT
Start: 2023-12-07 | End: 2023-12-08 | Stop reason: HOSPADM

## 2023-12-07 RX ADMIN — DEXMEDETOMIDINE HYDROCHLORIDE 4 MCG: 4 INJECTION, SOLUTION INTRAVENOUS at 10:04

## 2023-12-07 RX ADMIN — FENTANYL CITRATE 50 MCG: 50 INJECTION, SOLUTION INTRAMUSCULAR; INTRAVENOUS at 10:04

## 2023-12-07 RX ADMIN — PROPOFOL 200 MG: 10 INJECTION, EMULSION INTRAVENOUS at 09:54

## 2023-12-07 RX ADMIN — FENTANYL CITRATE 50 MCG: 0.05 INJECTION, SOLUTION INTRAMUSCULAR; INTRAVENOUS at 10:46

## 2023-12-07 RX ADMIN — FENTANYL CITRATE 50 MCG: 50 INJECTION, SOLUTION INTRAMUSCULAR; INTRAVENOUS at 09:54

## 2023-12-07 RX ADMIN — EPHEDRINE SULFATE 5 MG: 50 INJECTION, SOLUTION INTRAMUSCULAR; INTRAVENOUS; SUBCUTANEOUS at 10:06

## 2023-12-07 RX ADMIN — DEXAMETHASONE SODIUM PHOSPHATE 10 MG: 4 INJECTION, SOLUTION INTRA-ARTICULAR; INTRALESIONAL; INTRAMUSCULAR; INTRAVENOUS; SOFT TISSUE at 09:58

## 2023-12-07 RX ADMIN — CEFAZOLIN SODIUM 2 G: 2 INJECTION, SOLUTION INTRAVENOUS at 09:59

## 2023-12-07 RX ADMIN — SODIUM CHLORIDE, SODIUM LACTATE, POTASSIUM CHLORIDE, CALCIUM CHLORIDE: 600; 310; 30; 20 INJECTION, SOLUTION INTRAVENOUS at 09:39

## 2023-12-07 RX ADMIN — LIDOCAINE HYDROCHLORIDE 2 ML: 20 INJECTION, SOLUTION INFILTRATION; PERINEURAL at 09:54

## 2023-12-07 RX ADMIN — DEXMEDETOMIDINE HYDROCHLORIDE 6 MCG: 4 INJECTION, SOLUTION INTRAVENOUS at 09:58

## 2023-12-07 RX ADMIN — KETOROLAC TROMETHAMINE 15 MG: 30 INJECTION, SOLUTION INTRAMUSCULAR; INTRAVENOUS at 10:15

## 2023-12-07 RX ADMIN — ACETAMINOPHEN 975 MG: 325 TABLET ORAL at 09:32

## 2023-12-07 RX ADMIN — PROPOFOL 250 MCG/KG/MIN: 10 INJECTION, EMULSION INTRAVENOUS at 09:54

## 2023-12-07 RX ADMIN — ONDANSETRON 4 MG: 2 INJECTION INTRAMUSCULAR; INTRAVENOUS at 09:59

## 2023-12-07 RX ADMIN — OXYCODONE HYDROCHLORIDE 5 MG: 5 TABLET ORAL at 11:32

## 2023-12-07 RX ADMIN — EPHEDRINE SULFATE 10 MG: 50 INJECTION, SOLUTION INTRAMUSCULAR; INTRAVENOUS; SUBCUTANEOUS at 10:09

## 2023-12-07 RX ADMIN — EPHEDRINE SULFATE 10 MG: 50 INJECTION, SOLUTION INTRAMUSCULAR; INTRAVENOUS; SUBCUTANEOUS at 09:58

## 2023-12-07 ASSESSMENT — LIFESTYLE VARIABLES: TOBACCO_USE: 1

## 2023-12-07 NOTE — PROGRESS NOTES
Offered patient pregnancy test.  Explained there are risks associated with anesthesia and pregnancy.  Patient verbalizes understanding, denies possibility of pregnancy and declines pregnancy test.    Sean Queen RN on 12/7/2023 at 9:28 AM

## 2023-12-07 NOTE — ANESTHESIA POSTPROCEDURE EVALUATION
Patient: Danielle Jarrell    Procedure: Procedure(s):  Cystoscopy, Left Stent Removal, Left Ureteroscopy with Stone Basketing, Laser on Standby       Anesthesia Type:  General    Note:  Disposition: Outpatient   Postop Pain Control: Uneventful            Sign Out: Well controlled pain   PONV: No   Neuro/Psych: Uneventful            Sign Out: Acceptable/Baseline neuro status   Airway/Respiratory: Uneventful            Sign Out: Acceptable/Baseline resp. status   CV/Hemodynamics: Uneventful            Sign Out: Acceptable CV status; No obvious hypovolemia; No obvious fluid overload   Other NRE: NONE   DID A NON-ROUTINE EVENT OCCUR? No           Last vitals:  Vitals Value Taken Time   /60 12/07/23 1146   Temp 98.7  F (37.1  C) 12/07/23 1119   Pulse 76 12/07/23 1156   Resp 16 12/07/23 1119   SpO2 100 % 12/07/23 1156   Vitals shown include unfiled device data.    Electronically Signed By: Osiris King MD  December 7, 2023  12:04 PM

## 2023-12-07 NOTE — ANESTHESIA CARE TRANSFER NOTE
Patient: Danielle Jarrell    Procedure: Procedure(s):  Cystoscopy, Left Stent Removal, Left Ureteroscopy with Stone Basketing, Laser on Standby       Diagnosis: Nephrolithiasis [N20.0]  Diagnosis Additional Information: No value filed.    Anesthesia Type:   General     Note:    Oropharynx: oropharynx clear of all foreign objects  Level of Consciousness: drowsy  Oxygen Supplementation: face mask    Independent Airway: airway patency satisfactory and stable  Dentition: dentition unchanged  Vital Signs Stable: post-procedure vital signs reviewed and stable  Report to RN Given: handoff report given  Patient transferred to: Phase II    Handoff Report: Identifed the Patient, Identified the Reponsible Provider, Reviewed the pertinent medical history, Discussed the surgical course, Reviewed Intra-OP anesthesia mangement and issues during anesthesia, Set expectations for post-procedure period and Allowed opportunity for questions and acknowledgement of understanding      Vitals:  Vitals Value Taken Time   BP 95/52 12/07/23 1030   Temp 96.8  F (36  C) 12/07/23 1022   Pulse 91 12/07/23 1031   Resp 18 12/07/23 1022   SpO2 98 % 12/07/23 1031   Vitals shown include unfiled device data.    Electronically Signed By: Osiris King MD  December 7, 2023  10:34 AM

## 2023-12-07 NOTE — OP NOTE
OPERATIVE REPORT    PREOPERATIVE DIAGNOSIS:  Nephrolithiasis [N20.0]     POSTOPERATIVE DIAGNOSIS:  Same    PROCEDURES PERFORMED:   1. Cystoscopy  2. Left Ureteroscopy   3. Left Stone Basket Extraction (ureter stone and kidney stone)  4. Left  Ureteral Stent removal     STAFF SURGEON: Landon Noland MD was present and participatory for the entire case.   RESIDENT(S): None  FELLOW: None     ANESTHESIA: General    ESTIMATED BLOOD LOSS: <5 ml    DRAINS/TUBES:   None    IV FLUIDS: Please see dictated anesthesia record  COMPLICATIONS: None.   SPECIMEN:   Left ureteral stone for analysis    SIGNIFICANT FINDINGS:   Left proximal ureter stone removed intact. There was a embedded left upper calyx renal stone also removed intact. Stent not replaced. Kidney with evidence of randalls plaques       BRIEF OPERATIVE INDICATIONS:  Danielle Jarrell is a(n) 43 year old female who presented with a left proximal ureter stone and infection and underwent stent placement. There was concern of abscess development. After a discussion of all risks, benefits, and alternatives, the patient elected to proceed with definitive stone management with a ureteroscopic approach.    After induction of general anesthesia the patient was repositioned in dorsal lithotomy and prepped and draped in the standard sterile fashion.  A time out was performed confirming the appropriate patient identity and planned procedure.  The patient received culture directed antibiotics and had bilateral sequential compression devices for DVT propylaxis.    A 22-Yi rigid cystoscope was inserted into a well-lubricated urethra. The urethra was unremarkable without stricture.     The previous indwelling ureteral stent was identified and removed with the flexible stent grasper to the level of the urethral meatus. A sensor wire was advanced up to the renal pelvis under fluoroscopic guidance and previous stent discarded.    I went alongside the wire with the flexible  ureteroscope and up to proximal ureter where stone was identified. The stone was removed intact. I went along the wire again up to kidney and examined in detail. Stone identified in upper calyx that was embedded in papilla. I was able to use halo basket to remove it intact. I examined remainder of kidney and there was randalls plaques noted no other stones. Pullback ureteroscopy performed and no ureteral injury or ureteral stones    The bladder was drained    The patient tolerated the procedure well and there were no apparent complications. The patient  was transported to the postanesthesia care unit in stable condition.     POSTOP PLAN:  return to clinic in 6 weeks with Renal US   Will also see if that cyst / abscess in kidney has resolved with ultrasound, if cyst still present and complex then would get contrasted CT to further assess.  Will keep her on additional antibiotic brian-procedurally.     Landon Noland MD

## 2023-12-07 NOTE — ANESTHESIA PREPROCEDURE EVALUATION
Anesthesia Pre-Procedure Evaluation    Patient: Danielle Jarrell   MRN: [id number] : 1980        Procedure : Procedure(s):  Cystoscopy, Left Ureteroscopy, Left Retrograde Pyelogram, Left Laser Lithotripsy, Possible Left Ureteral Stent Placement, Possible Left Ureteral Stent Removal          History reviewed. No pertinent past medical history.   Past Surgical History:   Procedure Laterality Date    CHOLECYSTECTOMY      CHOLECYSTECTOMY      1 year ago     COLON SURGERY      polyp removal 2 1/2 years ago     COMBINED CYSTOSCOPY, INSERT STENT URETER(S) Left 2023    Procedure: CYSTOSCOPY, WITH LEFT URETERAL STENT INSERTION;  Surgeon: Chauncey Holland MD;  Location: [facility] Main OR    KIDNEY STONE SURGERY      1 1/2 year ago at [facility]     TONSILLECTOMY        No Known Allergies   Social History     Tobacco Use    Smoking status: Every Day     Types: Cigarettes     Last attempt to quit: 10/27/2009     Years since quittin.1    Smokeless tobacco: Current   Substance Use Topics    Alcohol use: Yes     Alcohol/week: 0.0 standard drinks of alcohol     Comment: Alcoholic Drinks/day: 2-3 drinks about 5 days a week      Wt Readings from Last 1 Encounters:   23 49.9 kg (110 lb)        Anesthesia Evaluation   Pt has had prior anesthetic. Type: MAC.        ROS/MED HX  ENT/Pulmonary:     (+)                tobacco use, Current use,                   (-) sleep apnea and MASSIEL risk factors   Neurologic:    (-) no CVA and no TIA   Cardiovascular:    (-) hypertension and irregular heartbeat/palpitations   METS/Exercise Tolerance:     Hematologic:     (+)      anemia,          Musculoskeletal:    (-) arthritis   GI/Hepatic:       Renal/Genitourinary:     (+) renal disease,      Nephrolithiasis ,       Endo:    (-) Type II DM   Psychiatric/Substance Use:       Infectious Disease:       Malignancy:       Other:            Physical Exam    Airway        Mallampati: II   TM distance: > 3 FB   Neck ROM:  "full     Respiratory Devices and Support         Dental       (+) Minor Abnormalities - some fillings, tiny chips    B=Bridge, C=Chipped, L=Loose, M=Missing    Cardiovascular          Rhythm and rate: regular     Pulmonary           breath sounds clear to auscultation         OUTSIDE LABS:  CBC:   Lab Results   Component Value Date    WBC 7.6 11/20/2023    WBC 24.6 (H) 11/19/2023    HGB 9.2 (L) 11/20/2023    HGB 9.5 (L) 11/19/2023    HCT 29.1 (L) 11/20/2023    HCT 30.9 (L) 11/19/2023     11/20/2023     11/19/2023     BMP:   Lab Results   Component Value Date     11/20/2023     11/19/2023    POTASSIUM 4.5 11/20/2023    POTASSIUM 4.8 11/19/2023    POTASSIUM 4.8 11/19/2023    CHLORIDE 112 (H) 11/20/2023    CHLORIDE 110 (H) 11/19/2023    CO2 22 11/20/2023    CO2 22 11/19/2023    BUN 15.5 11/20/2023    BUN 14.5 11/19/2023    CR 0.85 11/20/2023    CR 0.72 11/19/2023    CR 0.72 11/19/2023    GLC 92 11/20/2023     (H) 11/19/2023     COAGS: No results found for: \"PTT\", \"INR\", \"FIBR\"  POC:   Lab Results   Component Value Date    HCG Negative 11/17/2023     HEPATIC:   Lab Results   Component Value Date    ALBUMIN 3.1 (L) 11/18/2023    PROTTOTAL 5.3 (L) 11/18/2023    ALT 15 11/18/2023    AST 23 11/18/2023    ALKPHOS 62 11/18/2023    BILITOTAL 0.3 11/18/2023     OTHER:   Lab Results   Component Value Date    LACT 0.8 11/18/2023    KALEB 8.2 (L) 11/20/2023    MAG 1.9 11/20/2023       Anesthesia Plan    ASA Status:  2    NPO Status:  NPO Appropriate    Anesthesia Type: General.     - Airway: LMA   Induction: Intravenous.   Maintenance: TIVA.        Consents    Anesthesia Plan(s) and associated risks, benefits, and realistic alternatives discussed. Questions answered and patient/representative(s) expressed understanding.     - Discussed: Risks, Benefits and Alternatives for BOTH SEDATION and the PROCEDURE were discussed     - Discussed with:             Postoperative Care    Pain management: IV " analgesics, Oral pain medications, Multi-modal analgesia.   PONV prophylaxis: Ondansetron (or other 5HT-3), Dexamethasone or Solumedrol, Background Propofol Infusion     Comments:               Osiris King MD    I have reviewed the pertinent notes and labs in the chart from the past 30 days and (re)examined the patient.  Any updates or changes from those notes are reflected in this note.          # Hypoalbuminemia: Lowest albumin = 3.1 g/dL in the past 30 days , will monitor as appropriate

## 2023-12-07 NOTE — BRIEF OP NOTE
Brief Operative Note    Pre-operative diagnosis: Nephrolithiasis [N20.0]   Post-operative diagnosis * No post-op diagnosis entered *   Procedure: Procedure(s):  Cystoscopy, Left Stent Removal, Left Ureteroscopy with Stone Basketing, Laser on Standby   Surgeon: Landon Noland MD   Assistants(s): none   Estimated blood loss: Minimal    Specimens: Left ureter stone for analysis   Findings: Left proximal ureter stone removed intact. There was a embedded left upper calyx renal stone also removed intact. Stent not replaced.

## 2023-12-07 NOTE — ANESTHESIA PROCEDURE NOTES
Airway       Patient location during procedure: OR       Procedure Start/Stop Times: 12/7/2023 9:54 AM and 12/7/2023 9:55 AM  Staff -        Anesthesiologist:  Osiris King MD       Performed By: anesthesiologist  Consent for Airway        Urgency: elective  Indications and Patient Condition       Indications for airway management: brian-procedural       Induction type:intravenous       Mask difficulty assessment: 1 - vent by mask    Final Airway Details       Final airway type: supraglottic airway    Supraglottic Airway Details        Type: LMA       Brand: Ambu AuraGain       LMA size: 4    Post intubation assessment        Placement verified by: capnometry, equal breath sounds and chest rise        Number of attempts at approach: 1       Number of other approaches attempted: 0       Secured with: tape       Ease of procedure: easy       Dentition: Intact and Unchanged    Medication(s) Administered   Medication Administration Time: 12/7/2023 9:54 AM

## 2023-12-07 NOTE — PROGRESS NOTES
I met with Danielle    We will plan for left urs    We identified and discussed risks of ureteroscopic stone clearance including but not limited to ureteral injury, infection,incomplete stone clearance, and possible necessity of unanticipated additional procedures.    Landon Noland MD

## 2023-12-07 NOTE — DISCHARGE INSTRUCTIONS
If you have any questions or concerns regarding your procedure, please contact Dr. Noland, his office number is [phone].     You have received 975 mg of Acetaminophen (Tylenol) at 9:30 am . Please do not take an additional dose of Tylenol until after 3:30pm      Do not exceed 4,000 mg of acetaminophen during a 24 hour period and keep in mind that acetaminophen can also be found in many over-the-counter cold medications as well as narcotics that may be given for pain.     You received a dose of IV Toradol at 10:15AM. Please do not take any additional Ibuprofen/NSAID products (Aleve/Advil/Motrin/Naproxen/Celebrex) until after 4:15PM.    You received 5 mg of oxycodone at 11:33AM.       What can I expect after the stent is removed?  While most patients do not experience any symptoms after the stent is removed, some patients experience cramping due to bladder or ureteral spasms which may lead to feelings of nausea or urinary urgency. This is not unusual and will pass with time.  Continue to drink a lot of liquids and keep taking your pain medication as directed. Some doctors may prescribe medications to help alleviate these symptom    When to call your doctor?  Nausea, vomiting and unable to drink or keep down liquids  Chills, fever higher than 101  The stent falls out  Difficulty or inability to urinate  Constantly leaking urine  Severe pain that is not relieved by pain medication      Follow-Up:  - Follow up with [facility] in 6-8 weeks with ultrasound of kidney. We need to recheck ultrasound to make sure kidney has healed and that cyst or abscess has resolved.  - Call your primary care provider to touch base regarding your recent procedure.  - Call or return sooner than your regularly scheduled visit if you develop any of the following: fever (greater than 101.5), uncontrolled pain, uncontrolled nausea or vomiting, as well as increased redness, swelling, or drainage from your wound.     Phone  "numbers:   - Monday through Friday 8am to 4:30pm: Call [phone] with questions or to schedule or confirm appointment.    - Nights or weekends: call the after hours emergency pager - [phone] and tell the  \"I would like to page the Urology Resident on call.\"  - For emergencies, call [phone]    [facility]   Adult Discharge Orders & Instructions     For 24 hours after surgery    Get plenty of rest.  A responsible adult must stay with you for at least 24 hours after you leave the hospital.   Do not drive or use heavy equipment.  If you have weakness or tingling, don't drive or use heavy equipment until this feeling goes away.  Do not drink alcohol.  Avoid strenuous or risky activities.  Ask for help when climbing stairs.   You may feel lightheaded.  IF so, sit for a few minutes before standing.  Have someone help you get up.   If you have nausea (feel sick to your stomach): Drink only clear liquids such as apple juice, ginger ale, broth or 7-Up.  Rest may also help.  Be sure to drink enough fluids.  Move to a regular diet as you feel able.  You may have a slight fever. Call the doctor if your fever is over 100 F (37.7 C) (taken under the tongue) or lasts longer than 24 hours.  You may have a dry mouth, a sore throat, muscle aches or trouble sleeping.  These should go away after 24 hours.  Do not make important or legal decisions.   Call your doctor for any of the followin.  Signs of infection (fever, growing tenderness at the surgery site, a large amount of drainage or bleeding, severe pain, foul-smelling drainage, redness, swelling).    2. It has been over 8 to 10 hours since surgery and you are still not able to urinate (pass water).    3.  Headache for over 24 hours.        "

## 2023-12-10 LAB
APPEARANCE STONE: NORMAL
COMPN STONE: NORMAL
SPECIMEN WT: 28 MG

## 2023-12-12 ENCOUNTER — TELEPHONE (OUTPATIENT)
Dept: SURGERY | Facility: CLINIC | Age: 43
End: 2023-12-12
Payer: MEDICAID

## 2023-12-12 NOTE — TELEPHONE ENCOUNTER
Tried to call pt to see how she is doing after surgery, went to . Asked her to make sure to schedule ultrasound of kidney

## 2024-01-08 ENCOUNTER — TELEPHONE (OUTPATIENT)
Dept: UROLOGY | Facility: CLINIC | Age: 44
End: 2024-01-08
Payer: MEDICAID

## 2024-02-08 ENCOUNTER — TELEPHONE (OUTPATIENT)
Dept: UROLOGY | Facility: CLINIC | Age: 44
End: 2024-02-08

## 2024-02-08 NOTE — TELEPHONE ENCOUNTER
Message left for patient to call x2 regarding no showed renal ultrasound for post op follow up.  Virtual visit appt 2/8/24 was cancelled today.  Patient to call and reschedule both imaging and provider visit.

## (undated) DEVICE — SOL WATER IRRIG 3000ML BAG 2B7117

## (undated) DEVICE — SOL WATER IRRIG 1000ML BOTTLE 2F7114

## (undated) DEVICE — CUSTOM PACK CYSTO PREFERRED SOT5BCYHEA

## (undated) DEVICE — TUBING SUCTION MEDI-VAC 1/4"X20' N620A

## (undated) DEVICE — CATH URETERAL OPEN END 5FRX70CM M0064002010

## (undated) DEVICE — GLOVE SURG PI ULTRA TOUCH M SZ 8 LF

## (undated) DEVICE — GOWN XLG DISP 9545

## (undated) DEVICE — MAT FLOOR SURGICAL 40X38 0702140238

## (undated) DEVICE — SUCTION MANIFOLD NEPTUNE 2 SYS 1 PORT 702-025-000

## (undated) DEVICE — GUIDEWIRE SENSOR DUAL FLEX STR 0.035"X150CM M0066703080

## (undated) DEVICE — PREP DYNA-HEX 4% CHG SCRUB 4OZ BOTTLE MDS098710

## (undated) RX ORDER — PROPOFOL 10 MG/ML
INJECTION, EMULSION INTRAVENOUS
Status: DISPENSED
Start: 2023-11-18

## (undated) RX ORDER — ONDANSETRON 2 MG/ML
INJECTION INTRAMUSCULAR; INTRAVENOUS
Status: DISPENSED
Start: 2023-11-18

## (undated) RX ORDER — FENTANYL CITRATE 50 UG/ML
INJECTION, SOLUTION INTRAMUSCULAR; INTRAVENOUS
Status: DISPENSED
Start: 2023-11-18

## (undated) RX ORDER — LIDOCAINE HYDROCHLORIDE 10 MG/ML
INJECTION, SOLUTION EPIDURAL; INFILTRATION; INTRACAUDAL; PERINEURAL
Status: DISPENSED
Start: 2023-11-18

## (undated) RX ORDER — DEXAMETHASONE SODIUM PHOSPHATE 10 MG/ML
INJECTION, EMULSION INTRAMUSCULAR; INTRAVENOUS
Status: DISPENSED
Start: 2023-11-18

## (undated) RX ORDER — KETOROLAC TROMETHAMINE 30 MG/ML
INJECTION, SOLUTION INTRAMUSCULAR; INTRAVENOUS
Status: DISPENSED
Start: 2023-11-18